# Patient Record
Sex: FEMALE | HISPANIC OR LATINO | Employment: FULL TIME | URBAN - METROPOLITAN AREA
[De-identification: names, ages, dates, MRNs, and addresses within clinical notes are randomized per-mention and may not be internally consistent; named-entity substitution may affect disease eponyms.]

---
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
## 2022-01-14 ENCOUNTER — DOCUMENTATION ONLY (OUTPATIENT)
Dept: PSYCHOLOGY | Facility: CLINIC | Age: 22
End: 2022-01-14
Payer: COMMERCIAL

## 2022-01-14 NOTE — PROGRESS NOTES
Discharge Summary  Multiple Sessions    Client Name: Therese Salinas MRN#: [id number] YOB: 2000      Intake / Discharge Date: intake: 1/28/2021  Seen 5 further sessions through 5/18/2021  Discharge 1/14/2022      DSM5 Diagnoses: (Sustained by DSM5 Criteria Listed Above)  Diagnoses: 296.32 (F33.1) Major Depressive Disorder, Recurrent Episode, Moderate _ and With mixed features  300.01 (F41.0) Panic Disorder  300.02 (F41.1) Generalized Anxiety Disorder   Eating Disorder in remission  Psychosocial & Contextual Factors: COVID 19 restrictions, isolation, struggles with school focus and completion, history of eating disorder, grandfather on ventilator in [place], mother in [place] taking care of father, anxiety about college search  WHODAS 2.0 (12 item) Score: too young for this scale          Presenting Concern:  Anxiety, depression, irritability, reactivity      Reason for Discharge:  Client did not return      Disposition at Time of Last Encounter:   Comments:   Anxious about school work not completed and college hunt     Risk Management:   Client suicidal ideation, self injurious behavior  A safety and risk management plan has been developed including: Patient consented to co-developed safety plan on 1/28/2021.  Safety and risk management plan was reviewed.   Patient agreed to use safety plan should any safety concerns arise.  A copy was made available to the patient.      Referred To:  PCP for medication needs        RAOUL Ncikerson LMFT   1/14/2022

## 2022-06-14 PROBLEM — M79.644 PAIN OF RIGHT THUMB: Status: RESOLVED | Noted: 2021-07-23 | Resolved: 2022-06-14

## 2022-06-14 PROBLEM — T14.8XXA CRUSH INJURY: Status: RESOLVED | Noted: 2021-07-23 | Resolved: 2022-06-14

## 2022-08-25 ENCOUNTER — OFFICE VISIT (OUTPATIENT)
Dept: FAMILY MEDICINE | Facility: CLINIC | Age: 22
End: 2022-08-25
Payer: COMMERCIAL

## 2022-08-25 VITALS
HEIGHT: 66 IN | SYSTOLIC BLOOD PRESSURE: 110 MMHG | TEMPERATURE: 98.6 F | HEART RATE: 96 BPM | BODY MASS INDEX: 32.47 KG/M2 | WEIGHT: 202 LBS | DIASTOLIC BLOOD PRESSURE: 60 MMHG | OXYGEN SATURATION: 98 %

## 2022-08-25 DIAGNOSIS — F41.1 GENERALIZED ANXIETY DISORDER: ICD-10-CM

## 2022-08-25 DIAGNOSIS — F41.0 PANIC ATTACK: ICD-10-CM

## 2022-08-25 DIAGNOSIS — R41.840 ATTENTION DEFICIT: ICD-10-CM

## 2022-08-25 DIAGNOSIS — Z00.00 ROUTINE GENERAL MEDICAL EXAMINATION AT A HEALTH CARE FACILITY: Primary | ICD-10-CM

## 2022-08-25 DIAGNOSIS — Z11.3 SCREENING FOR STDS (SEXUALLY TRANSMITTED DISEASES): ICD-10-CM

## 2022-08-25 DIAGNOSIS — Z91.09 ENVIRONMENTAL ALLERGIES: ICD-10-CM

## 2022-08-25 PROCEDURE — 87591 N.GONORRHOEAE DNA AMP PROB: CPT | Performed by: PHYSICIAN ASSISTANT

## 2022-08-25 PROCEDURE — 99214 OFFICE O/P EST MOD 30 MIN: CPT | Mod: 25 | Performed by: PHYSICIAN ASSISTANT

## 2022-08-25 PROCEDURE — 99395 PREV VISIT EST AGE 18-39: CPT | Performed by: PHYSICIAN ASSISTANT

## 2022-08-25 PROCEDURE — 87491 CHLMYD TRACH DNA AMP PROBE: CPT | Performed by: PHYSICIAN ASSISTANT

## 2022-08-25 RX ORDER — SERTRALINE HYDROCHLORIDE 100 MG/1
100 TABLET, FILM COATED ORAL DAILY
Qty: 90 TABLET | Refills: 1 | Status: SHIPPED | OUTPATIENT
Start: 2022-08-25 | End: 2023-09-06

## 2022-08-25 RX ORDER — CETIRIZINE HYDROCHLORIDE 10 MG/1
10 TABLET ORAL DAILY
Qty: 90 TABLET | Refills: 3 | Status: SHIPPED | OUTPATIENT
Start: 2022-08-25 | End: 2023-09-06

## 2022-08-25 ASSESSMENT — ENCOUNTER SYMPTOMS
COUGH: 0
CHILLS: 0
JOINT SWELLING: 0
SORE THROAT: 0
PARESTHESIAS: 0
EYE PAIN: 0
WEAKNESS: 0
ABDOMINAL PAIN: 0
ARTHRALGIAS: 0
HEADACHES: 0
SHORTNESS OF BREATH: 0
FREQUENCY: 0
PALPITATIONS: 0
HEARTBURN: 0
NERVOUS/ANXIOUS: 0
DIARRHEA: 0
CONSTIPATION: 0
HEMATOCHEZIA: 0
MYALGIAS: 0
DIZZINESS: 0
NAUSEA: 0
DYSURIA: 0
HEMATURIA: 0
FEVER: 0

## 2022-08-25 ASSESSMENT — PATIENT HEALTH QUESTIONNAIRE - PHQ9
SUM OF ALL RESPONSES TO PHQ QUESTIONS 1-9: 21
5. POOR APPETITE OR OVEREATING: MORE THAN HALF THE DAYS
SUM OF ALL RESPONSES TO PHQ QUESTIONS 1-9: 21
10. IF YOU CHECKED OFF ANY PROBLEMS, HOW DIFFICULT HAVE THESE PROBLEMS MADE IT FOR YOU TO DO YOUR WORK, TAKE CARE OF THINGS AT HOME, OR GET ALONG WITH OTHER PEOPLE: EXTREMELY DIFFICULT

## 2022-08-25 ASSESSMENT — ANXIETY QUESTIONNAIRES
7. FEELING AFRAID AS IF SOMETHING AWFUL MIGHT HAPPEN: NEARLY EVERY DAY
1. FEELING NERVOUS, ANXIOUS, OR ON EDGE: MORE THAN HALF THE DAYS
6. BECOMING EASILY ANNOYED OR IRRITABLE: MORE THAN HALF THE DAYS
2. NOT BEING ABLE TO STOP OR CONTROL WORRYING: MORE THAN HALF THE DAYS
GAD7 TOTAL SCORE: 14
5. BEING SO RESTLESS THAT IT IS HARD TO SIT STILL: SEVERAL DAYS
IF YOU CHECKED OFF ANY PROBLEMS ON THIS QUESTIONNAIRE, HOW DIFFICULT HAVE THESE PROBLEMS MADE IT FOR YOU TO DO YOUR WORK, TAKE CARE OF THINGS AT HOME, OR GET ALONG WITH OTHER PEOPLE: VERY DIFFICULT
3. WORRYING TOO MUCH ABOUT DIFFERENT THINGS: MORE THAN HALF THE DAYS
GAD7 TOTAL SCORE: 14

## 2022-08-25 ASSESSMENT — ASTHMA QUESTIONNAIRES
QUESTION_4 LAST FOUR WEEKS HOW OFTEN HAVE YOU USED YOUR RESCUE INHALER OR NEBULIZER MEDICATION (SUCH AS ALBUTEROL): NOT AT ALL
QUESTION_5 LAST FOUR WEEKS HOW WOULD YOU RATE YOUR ASTHMA CONTROL: COMPLETELY CONTROLLED
QUESTION_1 LAST FOUR WEEKS HOW MUCH OF THE TIME DID YOUR ASTHMA KEEP YOU FROM GETTING AS MUCH DONE AT WORK, SCHOOL OR AT HOME: NONE OF THE TIME
QUESTION_2 LAST FOUR WEEKS HOW OFTEN HAVE YOU HAD SHORTNESS OF BREATH: NOT AT ALL
QUESTION_3 LAST FOUR WEEKS HOW OFTEN DID YOUR ASTHMA SYMPTOMS (WHEEZING, COUGHING, SHORTNESS OF BREATH, CHEST TIGHTNESS OR PAIN) WAKE YOU UP AT NIGHT OR EARLIER THAN USUAL IN THE MORNING: NOT AT ALL
ACT_TOTALSCORE: 25
ACT_TOTALSCORE: 25

## 2022-08-25 NOTE — PROGRESS NOTES
SUBJECTIVE:   CC: Therese Salinas is an 21 year old woman who presents for preventive health visit.       Patient has been advised of split billing requirements and indicates understanding: Yes  Healthy Habits:     Getting at least 3 servings of Calcium per day:  NO    Bi-annual eye exam:  NO    Dental care twice a year:  Yes    Sleep apnea or symptoms of sleep apnea:  None    Diet:  Vegetarian/vegan    Frequency of exercise:  None    Taking medications regularly:  Yes    PHQ-2 Total Score: 6    Additional concerns today:  Yes      Going to [employer].   Had some issues with room mate. Wasn't able to complete spring semester. Going back in the fall - new living situation. Back in the dorms.     Concern about ADHD. Has some issues with attention, can't focus for longer periods of time.     Depression and Anxiety Follow-Up    How are you doing with your depression since your last visit? Worsened     How are you doing with your anxiety since your last visit?  No change    Are you having other symptoms that might be associated with depression or anxiety? No    Have you had a significant life event? Grief or Loss     Do you have any concerns with your use of alcohol or other drugs? No    Social History     Tobacco Use     Smoking status: Never Smoker     Smokeless tobacco: Never Used   Substance Use Topics     Alcohol use: Never     Drug use: Never     PHQ 8/23/2021 8/23/2021 8/25/2022   PHQ-9 Total Score 14 14 21   Q9: Thoughts of better off dead/self-harm past 2 weeks Not at all Not at all Several days   F/U: Thoughts of suicide or self-harm - - Yes   F/U: Self harm-plan - - No   F/U: Self-harm action - - No   F/U: Safety concerns - - No   PHQ-A Total Score - - -   PHQ-A Mood affect on daily activities - - -   PHQ-A Suicide Ideation past 2 weeks - - -     MONIQUE-7 SCORE 5/18/2021 8/23/2021 8/25/2022   Total Score 14 (moderate anxiety) - -   Total Score 14 21 14     Last PHQ-9 8/25/2022   1.  Little interest or pleasure  in doing things 3   2.  Feeling down, depressed, or hopeless 3   3.  Trouble falling or staying asleep, or sleeping too much 3   4.  Feeling tired or having little energy 3   5.  Poor appetite or overeating 3   6.  Feeling bad about yourself 2   7.  Trouble concentrating 3   8.  Moving slowly or restless 0   Q9: Thoughts of better off dead/self-harm past 2 weeks 1   PHQ-9 Total Score 21   Difficulty at work, home, or with people -   In the past two weeks have you had thoughts of suicide or self harm? Yes   Do you have concerns about your personal safety or the safety of others? No   In the past 2 weeks have you thought about a plan or had intention to harm yourself? No   In the past 2 weeks have you acted on these thoughts in any way? No     MONIQUE-7  8/25/2022   1. Feeling nervous, anxious, or on edge 2   2. Not being able to stop or control worrying 2   3. Worrying too much about different things 2   4. Trouble relaxing 2   5. Being so restless that it is hard to sit still 1   6. Becoming easily annoyed or irritable 2   7. Feeling afraid, as if something awful might happen 3   MONIQUE-7 Total Score 14   If you checked any problems, how difficult have they made it for you to do your work, take care of things at home, or get along with other people? Very difficult             Follow Up Actions Taken  Mental Health Referral placed     Discussed the following ways the patient can remain in a safe environment:  be around others  Suicide Assessment Five-step Evaluation and Treatment (SAFE-T)      Today's PHQ-2 Score:   PHQ-2 ( 1999 Pfizer) 8/25/2022   Q1: Little interest or pleasure in doing things 3   Q2: Feeling down, depressed or hopeless 3   PHQ-2 Score 6   PHQ-2 Total Score (12-17 Years)- Positive if 3 or more points; Administer PHQ-A if positive -   Q1: Little interest or pleasure in doing things Nearly every day   Q2: Feeling down, depressed or hopeless Nearly every day   PHQ-2 Score 6       Abuse: Current or Past  (Physical, Sexual or Emotional) - No  Do you feel safe in your environment? Yes        Social History     Tobacco Use     Smoking status: Never Smoker     Smokeless tobacco: Never Used   Substance Use Topics     Alcohol use: Never     If you drink alcohol do you typically have >3 drinks per day or >7 drinks per week? No    Alcohol Use 8/25/2022   Prescreen: >3 drinks/day or >7 drinks/week? Not Applicable   Prescreen: >3 drinks/day or >7 drinks/week? -   No flowsheet data found.    Reviewed orders with patient.  Reviewed health maintenance and updated orders accordingly - Yes  BP Readings from Last 3 Encounters:   08/25/22 110/60   08/23/21 115/73   07/06/21 110/71    Wt Readings from Last 3 Encounters:   08/25/22 91.6 kg (202 lb) (98 %, Z= 1.97)*   08/23/21 91.6 kg (202 lb 0.2 oz) (98 %, Z= 1.99)*   07/06/21 89.8 kg (198 lb) (97 %, Z= 1.94)*     * Growth percentiles are based on CDC (Girls, 2-20 Years) data.                    Breast Cancer Screening:        History of abnormal Pap smear: NO - under age 21, PAP not appropriate for age     Reviewed and updated as needed this visit by clinical staff   Tobacco  Allergies  Meds  Problems  Med Hx  Surg Hx  Fam Hx  Soc   Hx          Reviewed and updated as needed this visit by Provider   Tobacco  Allergies  Meds  Problems  Med Hx  Surg Hx  Fam Hx               Review of Systems   Constitutional: Negative for chills and fever.   HENT: Negative for congestion, ear pain, hearing loss and sore throat.    Eyes: Negative for pain and visual disturbance.   Respiratory: Negative for cough and shortness of breath.    Cardiovascular: Negative for chest pain, palpitations and peripheral edema.   Gastrointestinal: Negative for abdominal pain, constipation, diarrhea, heartburn, hematochezia and nausea.   Genitourinary: Negative for dysuria, frequency, genital sores, hematuria and urgency.   Musculoskeletal: Negative for arthralgias, joint swelling and myalgias.   Skin:  "Negative for rash.   Neurological: Negative for dizziness, weakness, headaches and paresthesias.   Psychiatric/Behavioral: Negative for mood changes. The patient is not nervous/anxious.           OBJECTIVE:   /60   Pulse 96   Temp 98.6  F (37  C) (Oral)   Ht 1.683 m (5' 6.25\")   Wt 91.6 kg (202 lb)   SpO2 98%   BMI 32.36 kg/m    Physical Exam  GENERAL: healthy, alert and no distress  EYES: Eyes grossly normal to inspection, PERRL and conjunctivae and sclerae normal  HENT: ear canals and TM's normal, nose and mouth without ulcers or lesions  NECK: no adenopathy, no asymmetry, masses, or scars and thyroid normal to palpation  RESP: lungs clear to auscultation - no rales, rhonchi or wheezes  BREAST: normal without masses, tenderness or nipple discharge and no palpable axillary masses or adenopathy  CV: regular rate and rhythm, normal S1 S2, no S3 or S4, no murmur, click or rub, no peripheral edema and peripheral pulses strong  ABDOMEN: soft, nontender, no hepatosplenomegaly, no masses and bowel sounds normal  MS: no gross musculoskeletal defects noted, no edema  SKIN: no suspicious lesions or rashes  NEURO: Normal strength and tone, mentation intact and speech normal  PSYCH: mentation appears normal, affect normal/bright    Diagnostic Test Results:  Labs reviewed in Epic    ASSESSMENT/PLAN:   1. Routine general medical examination at a health care facility  Well adult.     2. Generalized anxiety disorder  3. Panic attack  Refilled medications.   - Adult Mental Health  Referral; Future  - sertraline (ZOLOFT) 100 MG tablet; Take 1 tablet (100 mg) by mouth daily  Dispense: 90 tablet; Refill: 1    4. Attention deficit  Patient interested in ADHD testing.  - [facility]; Future    5. Environmental allergies  Suggest switching to zyrtec. Has dog allergy - new dog at home, but moving back to college soon.  - cetirizine (ZYRTEC) 10 MG tablet; Take 1 tablet (10 mg) by mouth daily  " "Dispense: 90 tablet; Refill: 3    6. Screening for STDs (sexually transmitted diseases)  Screen.  - NEISSERIA GONORRHOEA PCR  - CHLAMYDIA TRACHOMATIS PCR      Patient has been advised of split billing requirements and indicates understanding: Yes    COUNSELING:  Special attention given to:        Regular exercise       Healthy diet/nutrition       Contraception    Estimated body mass index is 32.36 kg/m  as calculated from the following:    Height as of this encounter: 1.683 m (5' 6.25\").    Weight as of this encounter: 91.6 kg (202 lb).    Weight management plan: Discussed healthy diet and exercise guidelines    She reports that she has never smoked. She has never used smokeless tobacco.      Counseling Resources:  ATP IV Guidelines  Pooled Cohorts Equation Calculator  Breast Cancer Risk Calculator  BRCA-Related Cancer Risk Assessment: FHS-7 Tool  FRAX Risk Assessment  ICSI Preventive Guidelines  Dietary Guidelines for Americans, 2010  USDA's MyPlate  ASA Prophylaxis  Lung CA Screening    Gauri Dickens PA-C  [facility] [place]  Answers for HPI/ROS submitted by the patient on 8/25/2022  If you checked off any problems, how difficult have these problems made it for you to do your work, take care of things at home, or get along with other people?: Extremely difficult  PHQ9 TOTAL SCORE: 21      "

## 2022-08-26 LAB
C TRACH DNA SPEC QL NAA+PROBE: NEGATIVE
N GONORRHOEA DNA SPEC QL NAA+PROBE: NEGATIVE

## 2022-09-07 ENCOUNTER — TELEPHONE (OUTPATIENT)
Dept: FAMILY MEDICINE | Facility: CLINIC | Age: 22
End: 2022-09-07

## 2022-09-07 NOTE — TELEPHONE ENCOUNTER
Reason for Call:  Form, our goal is to have forms completed with 72 hours, however, some forms may require a visit or additional information.    Type of letter, form or note:  [employer] REWARD FORM    Who is the form from?: Patient    Where did the form come from: Patient or family brought in       What clinic location was the form placed at?: [facility]    Where the form was placed: Provider's Box Box/Folder    What number is listed as a contact on the form?: [phone]       Additional comments: Please mail completed form to patient's home at [address] [place] [phone]    Call taken on 9/7/2022 at 12:44 PM by Amira Cintron

## 2022-09-17 ENCOUNTER — HEALTH MAINTENANCE LETTER (OUTPATIENT)
Age: 22
End: 2022-09-17

## 2022-09-23 DIAGNOSIS — J30.2 SEASONAL ALLERGIC RHINITIS, UNSPECIFIED TRIGGER: ICD-10-CM

## 2022-09-23 DIAGNOSIS — J45.20 MILD INTERMITTENT ASTHMA WITHOUT COMPLICATION: ICD-10-CM

## 2022-09-23 NOTE — TELEPHONE ENCOUNTER
albuterol (PROAIR HFA/PROVENTIL HFA/VENTOLIN HFA) 108 (90 Base) MCG/ACT inhaler 8.5 g 3 8/23/2021

## 2022-09-24 RX ORDER — ALBUTEROL SULFATE 90 UG/1
2 AEROSOL, METERED RESPIRATORY (INHALATION) EVERY 6 HOURS
Qty: 8.5 G | Refills: 3 | Status: SHIPPED | OUTPATIENT
Start: 2022-09-24 | End: 2022-10-28

## 2022-09-24 NOTE — TELEPHONE ENCOUNTER
ACT Total Scores 6/1/2021 8/23/2021 8/25/2022   ACT TOTAL SCORE (Goal Greater than or Equal to 20) 23 25 25   In the past 12 months, how many times did you visit the emergency room for your asthma without being admitted to the hospital? 0 0 0   In the past 12 months, how many times were you hospitalized overnight because of your asthma? 0 0 0

## 2022-11-09 ENCOUNTER — MYC REFILL (OUTPATIENT)
Dept: FAMILY MEDICINE | Facility: CLINIC | Age: 22
End: 2022-11-09

## 2022-11-09 DIAGNOSIS — F41.0 PANIC ATTACK: ICD-10-CM

## 2022-11-09 DIAGNOSIS — F41.1 GENERALIZED ANXIETY DISORDER: ICD-10-CM

## 2022-11-10 RX ORDER — LORAZEPAM 1 MG/1
0.5 TABLET ORAL EVERY 8 HOURS PRN
Qty: 10 TABLET | Refills: 0 | Status: SHIPPED | OUTPATIENT
Start: 2022-11-10 | End: 2023-09-09

## 2023-03-31 ENCOUNTER — MYC MEDICAL ADVICE (OUTPATIENT)
Dept: FAMILY MEDICINE | Facility: CLINIC | Age: 23
End: 2023-03-31

## 2023-03-31 ENCOUNTER — VIRTUAL VISIT (OUTPATIENT)
Dept: FAMILY MEDICINE | Facility: CLINIC | Age: 23
End: 2023-03-31
Payer: COMMERCIAL

## 2023-03-31 DIAGNOSIS — F41.1 GENERALIZED ANXIETY DISORDER: ICD-10-CM

## 2023-03-31 DIAGNOSIS — R06.83 SNORING: Primary | ICD-10-CM

## 2023-03-31 PROCEDURE — 96127 BRIEF EMOTIONAL/BEHAV ASSMT: CPT | Performed by: PHYSICIAN ASSISTANT

## 2023-03-31 PROCEDURE — 99214 OFFICE O/P EST MOD 30 MIN: CPT | Mod: VID | Performed by: PHYSICIAN ASSISTANT

## 2023-03-31 ASSESSMENT — PATIENT HEALTH QUESTIONNAIRE - PHQ9
SUM OF ALL RESPONSES TO PHQ QUESTIONS 1-9: 16
10. IF YOU CHECKED OFF ANY PROBLEMS, HOW DIFFICULT HAVE THESE PROBLEMS MADE IT FOR YOU TO DO YOUR WORK, TAKE CARE OF THINGS AT HOME, OR GET ALONG WITH OTHER PEOPLE: VERY DIFFICULT
SUM OF ALL RESPONSES TO PHQ QUESTIONS 1-9: 16

## 2023-03-31 ASSESSMENT — ASTHMA QUESTIONNAIRES
QUESTION_4 LAST FOUR WEEKS HOW OFTEN HAVE YOU USED YOUR RESCUE INHALER OR NEBULIZER MEDICATION (SUCH AS ALBUTEROL): NOT AT ALL
ACT_TOTALSCORE: 25
QUESTION_2 LAST FOUR WEEKS HOW OFTEN HAVE YOU HAD SHORTNESS OF BREATH: NOT AT ALL
ACT_TOTALSCORE: 25
QUESTION_5 LAST FOUR WEEKS HOW WOULD YOU RATE YOUR ASTHMA CONTROL: COMPLETELY CONTROLLED
QUESTION_1 LAST FOUR WEEKS HOW MUCH OF THE TIME DID YOUR ASTHMA KEEP YOU FROM GETTING AS MUCH DONE AT WORK, SCHOOL OR AT HOME: NONE OF THE TIME
QUESTION_3 LAST FOUR WEEKS HOW OFTEN DID YOUR ASTHMA SYMPTOMS (WHEEZING, COUGHING, SHORTNESS OF BREATH, CHEST TIGHTNESS OR PAIN) WAKE YOU UP AT NIGHT OR EARLIER THAN USUAL IN THE MORNING: NOT AT ALL

## 2023-03-31 NOTE — PROGRESS NOTES
"Therese is a 22 year old who is being evaluated via a billable video visit.      How would you like to obtain your AVS? MyChart  If the video visit is dropped, the invitation should be resent by: Text to cell phone: [phone]  Will anyone else be joining your video visit? No          Assessment & Plan     Snoring  Issues with snoring, mouth breathing. Because of possible apneic episode - will start with sleep medicine. May need ENT eval if having obstructive symptoms as well.   - Adult Sleep Eval & Management Referral; Future    Generalized anxiety disorder  Ongoing anxiety. Will restart sertraline - take it more regularly. Then follow up in 6 weeks or so. She has forms for school for single rooms she'd like me to fill out. She'll send it to me on [employer]. Room mate situation increases anxiety.                BMI:   Estimated body mass index is 32.36 kg/m  as calculated from the following:    Height as of 8/25/22: 1.683 m (5' 6.25\").    Weight as of 8/25/22: 91.6 kg (202 lb).   Weight management plan: Discussed healthy diet and exercise guidelines    Depression Screening Follow Up    PHQ 3/31/2023   PHQ-9 Total Score 16   Q9: Thoughts of better off dead/self-harm past 2 weeks Several days   F/U: Thoughts of suicide or self-harm No   F/U: Self harm-plan -   F/U: Self-harm action -   F/U: Safety concerns No   PHQ-A Total Score -   PHQ-A Mood affect on daily activities -   PHQ-A Suicide Ideation past 2 weeks -                 Follow Up    Follow Up Actions Taken  Follow up in 6 weeks.     Discussed the following ways the patient can remain in a safe environment:  be around others      Gauri Dickens PA-C  [facility]    Mega Saleh is a 22 year old, presenting for the following health issues:  No chief complaint on file.    Additional Questions 3/31/2023   Roomed by spenser     History of Present Illness       Reason for visit:  Sleep apnea  Symptom onset:  More than a month  Symptoms " include:  Snoring, feeling fatigued after a good nights rest, dry throat  Symptom intensity:  Moderate  Symptom progression:  Worsening  Had these symptoms before:  No    She eats 0-1 servings of fruits and vegetables daily.She consumes 1 sweetened beverage(s) daily.She exercises with enough effort to increase her heart rate 9 or less minutes per day.  She exercises with enough effort to increase her heart rate 3 or less days per week. She is missing 4 dose(s) of medications per week.  She is not taking prescribed medications regularly due to side effects and other.    Today's PHQ-9         PHQ-9 Total Score: 16    PHQ-9 Q9 Thoughts of better off dead/self-harm past 2 weeks :   Several days  Thoughts of suicide or self harm: (P) No  Self-harm Plan:     Self-harm Action:       Safety concerns for self or others: (P) No    How difficult have these problems made it for you to do your work, take care of things at home, or get along with other people: Very difficult       Concerns about stopping breathing in the middle of the night - per room mate. Mouth is always dry - breathes through her nose.     Mood - has been up and down. Hasn't been great about taking zoloft.       PHQ 8/23/2021 8/25/2022 3/31/2023   PHQ-9 Total Score 14 21 16   Q9: Thoughts of better off dead/self-harm past 2 weeks Not at all Several days Several days   F/U: Thoughts of suicide or self-harm - Yes No   F/U: Self harm-plan - No -   F/U: Self-harm action - No -   F/U: Safety concerns - No No   PHQ-A Total Score - - -   PHQ-A Mood affect on daily activities - - -   PHQ-A Suicide Ideation past 2 weeks - - -     MONIQUE-7 SCORE 5/18/2021 8/23/2021 8/25/2022   Total Score 14 (moderate anxiety) - -   Total Score 14 21 14         Review of Systems   Constitutional, HEENT, cardiovascular, pulmonary, gi and gu systems are negative, except as otherwise noted.      Objective           Vitals:  No vitals were obtained today due to virtual visit.    Physical Exam    GENERAL: Healthy, alert and no distress  EYES: Eyes grossly normal to inspection.  No discharge or erythema, or obvious scleral/conjunctival abnormalities.  RESP: No audible wheeze, cough, or visible cyanosis.  No visible retractions or increased work of breathing.    SKIN: Visible skin clear. No significant rash, abnormal pigmentation or lesions.  NEURO: Cranial nerves grossly intact.  Mentation and speech appropriate for age.  PSYCH: Mentation appears normal, affect normal/bright, judgement and insight intact, normal speech and appearance well-groomed.                Video-Visit Details    Type of service:  Video Visit   Video Start Time: 3:01 PM  Video End Time:3:11 PM    Originating Location (pt. Location): Home    Distant Location (provider location):  On-site  Platform used for Video Visit: Sp

## 2023-04-10 NOTE — TELEPHONE ENCOUNTER
School is stating they have not received the response to the form completed via link.  Patient is requesting a copy of e-mail confirmation. School informed her that a PDF copy of answers should have been sent via e-mail.     Patient would like this sent to email - [email or web address]    Nicole Varner -    [facility]

## 2023-04-11 NOTE — TELEPHONE ENCOUNTER
I didn't put in my email for a copy - so redid it. Will email to Nicole Varner if I receive it.    Gauri Dickens PA-C

## 2023-07-26 ENCOUNTER — PATIENT OUTREACH (OUTPATIENT)
Dept: CARE COORDINATION | Facility: CLINIC | Age: 23
End: 2023-07-26
Payer: COMMERCIAL

## 2023-09-05 DIAGNOSIS — F41.0 PANIC ATTACK: ICD-10-CM

## 2023-09-05 DIAGNOSIS — Z91.09 ENVIRONMENTAL ALLERGIES: ICD-10-CM

## 2023-09-05 DIAGNOSIS — F41.1 GENERALIZED ANXIETY DISORDER: ICD-10-CM

## 2023-09-06 RX ORDER — CETIRIZINE HYDROCHLORIDE 10 MG/1
10 TABLET ORAL DAILY
Qty: 30 TABLET | Refills: 11 | Status: SHIPPED | OUTPATIENT
Start: 2023-09-06 | End: 2023-12-28

## 2023-09-06 RX ORDER — SERTRALINE HYDROCHLORIDE 100 MG/1
100 TABLET, FILM COATED ORAL DAILY
Qty: 90 TABLET | Refills: 0 | Status: SHIPPED | OUTPATIENT
Start: 2023-09-06 | End: 2023-09-09

## 2023-09-09 ENCOUNTER — MYC REFILL (OUTPATIENT)
Dept: FAMILY MEDICINE | Facility: CLINIC | Age: 23
End: 2023-09-09
Payer: COMMERCIAL

## 2023-09-09 DIAGNOSIS — F41.0 PANIC ATTACK: ICD-10-CM

## 2023-09-09 DIAGNOSIS — F41.1 GENERALIZED ANXIETY DISORDER: ICD-10-CM

## 2023-09-11 RX ORDER — LORAZEPAM 1 MG/1
0.5 TABLET ORAL EVERY 8 HOURS PRN
Qty: 10 TABLET | Refills: 0 | Status: SHIPPED | OUTPATIENT
Start: 2023-09-11 | End: 2023-12-28

## 2023-09-11 RX ORDER — SERTRALINE HYDROCHLORIDE 100 MG/1
100 TABLET, FILM COATED ORAL DAILY
Qty: 60 TABLET | Refills: 0 | Status: SHIPPED | OUTPATIENT
Start: 2023-09-11 | End: 2023-10-16

## 2023-09-28 DIAGNOSIS — F41.1 GENERALIZED ANXIETY DISORDER: ICD-10-CM

## 2023-09-28 DIAGNOSIS — F41.0 PANIC ATTACK: ICD-10-CM

## 2023-09-28 RX ORDER — SERTRALINE HYDROCHLORIDE 100 MG/1
100 TABLET, FILM COATED ORAL DAILY
Qty: 60 TABLET | Refills: 0 | OUTPATIENT
Start: 2023-09-28

## 2023-10-08 ENCOUNTER — HEALTH MAINTENANCE LETTER (OUTPATIENT)
Age: 23
End: 2023-10-08

## 2023-10-16 ENCOUNTER — OFFICE VISIT (OUTPATIENT)
Dept: INTERNAL MEDICINE | Facility: CLINIC | Age: 23
End: 2023-10-16
Payer: COMMERCIAL

## 2023-10-16 VITALS
TEMPERATURE: 98.7 F | HEART RATE: 89 BPM | DIASTOLIC BLOOD PRESSURE: 76 MMHG | SYSTOLIC BLOOD PRESSURE: 116 MMHG | WEIGHT: 204 LBS | BODY MASS INDEX: 32.78 KG/M2 | HEIGHT: 66 IN | OXYGEN SATURATION: 96 %

## 2023-10-16 DIAGNOSIS — Z83.3 FAMILY HISTORY OF DIABETES MELLITUS: ICD-10-CM

## 2023-10-16 DIAGNOSIS — F41.1 GENERALIZED ANXIETY DISORDER: ICD-10-CM

## 2023-10-16 DIAGNOSIS — Z11.59 NEED FOR HEPATITIS C SCREENING TEST: ICD-10-CM

## 2023-10-16 DIAGNOSIS — Z11.3 SCREENING FOR STDS (SEXUALLY TRANSMITTED DISEASES): ICD-10-CM

## 2023-10-16 DIAGNOSIS — K59.00 CONSTIPATION, UNSPECIFIED CONSTIPATION TYPE: ICD-10-CM

## 2023-10-16 DIAGNOSIS — Z11.4 SCREENING FOR HIV (HUMAN IMMUNODEFICIENCY VIRUS): Primary | ICD-10-CM

## 2023-10-16 DIAGNOSIS — F41.0 PANIC ATTACK: ICD-10-CM

## 2023-10-16 DIAGNOSIS — Z00.00 ROUTINE GENERAL MEDICAL EXAMINATION AT A HEALTH CARE FACILITY: ICD-10-CM

## 2023-10-16 DIAGNOSIS — J45.20 MILD INTERMITTENT ASTHMA WITHOUT COMPLICATION: ICD-10-CM

## 2023-10-16 DIAGNOSIS — J30.2 SEASONAL ALLERGIC RHINITIS, UNSPECIFIED TRIGGER: ICD-10-CM

## 2023-10-16 DIAGNOSIS — Z83.42 FAMILY HISTORY OF HIGH CHOLESTEROL: ICD-10-CM

## 2023-10-16 LAB
CHOLEST SERPL-MCNC: 162 MG/DL
HBA1C MFR BLD: 5.4 % (ref 0–5.6)
HDLC SERPL-MCNC: 28 MG/DL
LDLC SERPL CALC-MCNC: 99 MG/DL
NONHDLC SERPL-MCNC: 134 MG/DL
TRIGL SERPL-MCNC: 176 MG/DL

## 2023-10-16 PROCEDURE — 99395 PREV VISIT EST AGE 18-39: CPT

## 2023-10-16 PROCEDURE — 36415 COLL VENOUS BLD VENIPUNCTURE: CPT

## 2023-10-16 PROCEDURE — 83036 HEMOGLOBIN GLYCOSYLATED A1C: CPT

## 2023-10-16 PROCEDURE — 99214 OFFICE O/P EST MOD 30 MIN: CPT | Mod: 25

## 2023-10-16 PROCEDURE — 80061 LIPID PANEL: CPT

## 2023-10-16 RX ORDER — SENNA AND DOCUSATE SODIUM 50; 8.6 MG/1; MG/1
1 TABLET, FILM COATED ORAL AT BEDTIME
Qty: 30 TABLET | Refills: 3 | Status: SHIPPED | OUTPATIENT
Start: 2023-10-16

## 2023-10-16 RX ORDER — SERTRALINE HYDROCHLORIDE 100 MG/1
100 TABLET, FILM COATED ORAL DAILY
Qty: 60 TABLET | Refills: 3 | Status: SHIPPED | OUTPATIENT
Start: 2023-10-16

## 2023-10-16 RX ORDER — ALBUTEROL SULFATE 90 UG/1
2 AEROSOL, METERED RESPIRATORY (INHALATION) EVERY 6 HOURS
Qty: 8.5 G | Refills: 2 | Status: SHIPPED | OUTPATIENT
Start: 2023-10-16 | End: 2023-12-22

## 2023-10-16 ASSESSMENT — ENCOUNTER SYMPTOMS
DIZZINESS: 0
SHORTNESS OF BREATH: 0
PALPITATIONS: 0
CHILLS: 0
NERVOUS/ANXIOUS: 1
HEMATURIA: 0
SORE THROAT: 0
WEAKNESS: 0
ABDOMINAL PAIN: 0
COUGH: 0
DIARRHEA: 0
MYALGIAS: 0
ARTHRALGIAS: 0
NAUSEA: 0
CONSTIPATION: 0
FEVER: 0
JOINT SWELLING: 0
BREAST MASS: 0
DYSURIA: 0
HEADACHES: 0
FREQUENCY: 0
EYE PAIN: 0
PARESTHESIAS: 0
HEMATOCHEZIA: 0
HEARTBURN: 0

## 2023-10-16 ASSESSMENT — COLUMBIA-SUICIDE SEVERITY RATING SCALE - C-SSRS
6. HAVE YOU EVER DONE ANYTHING, STARTED TO DO ANYTHING, OR PREPARED TO DO ANYTHING TO END YOUR LIFE?: NO
1. WITHIN THE PAST MONTH, HAVE YOU WISHED YOU WERE DEAD OR WISHED YOU COULD GO TO SLEEP AND NOT WAKE UP?: NO
2. IN THE PAST MONTH, HAVE YOU ACTUALLY HAD ANY THOUGHTS OF KILLING YOURSELF?: NO

## 2023-10-16 ASSESSMENT — PATIENT HEALTH QUESTIONNAIRE - PHQ9
10. IF YOU CHECKED OFF ANY PROBLEMS, HOW DIFFICULT HAVE THESE PROBLEMS MADE IT FOR YOU TO DO YOUR WORK, TAKE CARE OF THINGS AT HOME, OR GET ALONG WITH OTHER PEOPLE: EXTREMELY DIFFICULT
SUM OF ALL RESPONSES TO PHQ QUESTIONS 1-9: 17
SUM OF ALL RESPONSES TO PHQ QUESTIONS 1-9: 17

## 2023-10-16 ASSESSMENT — ASTHMA QUESTIONNAIRES: ACT_TOTALSCORE: 20

## 2023-10-16 NOTE — PROGRESS NOTES
SUBJECTIVE:   CC: Therese is an 22 year old who presents for preventive health visit.       10/16/2023     8:45 AM   Additional Questions   Roomed by Tana MEYER       Healthy Habits:     Getting at least 3 servings of Calcium per day:  Yes    Bi-annual eye exam:  NO    Dental care twice a year:  NO    Sleep apnea or symptoms of sleep apnea:  Daytime drowsiness    Diet:  Vegetarian/vegan    Frequency of exercise:  2-3 days/week    Duration of exercise:  30-45 minutes    Taking medications regularly:  Yes    Medication side effects:  None    Additional concerns today:  Yes      Today's PHQ-9 Score:       10/16/2023     8:43 AM   PHQ-9 SCORE   PHQ-9 Total Score MyChart 17 (Moderately severe depression)   PHQ-9 Total Score 17                 Med refills      Social History     Tobacco Use    Smoking status: Never    Smokeless tobacco: Never   Substance Use Topics    Alcohol use: Never             10/16/2023     8:45 AM   Alcohol Use   Prescreen: >3 drinks/day or >7 drinks/week? No     Reviewed orders with patient.  Reviewed health maintenance and updated orders accordingly - Yes  Lab work is in process    Breast Cancer Screening: Not needed at this time.         History of abnormal Pap smear: NO - under age 21, PAP not appropriate for age     Reviewed and updated as needed this visit by clinical staff   Tobacco  Allergies  Meds              Reviewed and updated as needed this visit by Provider                     Review of Systems   Constitutional:  Negative for chills and fever.   HENT:  Positive for congestion. Negative for ear pain, hearing loss and sore throat.    Eyes:  Negative for pain and visual disturbance.   Respiratory:  Negative for cough and shortness of breath.    Cardiovascular:  Negative for chest pain, palpitations and peripheral edema.   Gastrointestinal:  Negative for abdominal pain, constipation, diarrhea, heartburn, hematochezia and nausea.   Breasts:  Negative for tenderness, breast mass and  "discharge.   Genitourinary:  Negative for dysuria, frequency, genital sores, hematuria, pelvic pain, urgency, vaginal bleeding and vaginal discharge.   Musculoskeletal:  Negative for arthralgias, joint swelling and myalgias.   Skin:  Negative for rash.   Neurological:  Negative for dizziness, weakness, headaches and paresthesias.   Psychiatric/Behavioral:  Negative for mood changes. The patient is nervous/anxious.           OBJECTIVE:   /76 (BP Location: Right arm, Patient Position: Sitting, Cuff Size: Adult Large)   Pulse 89   Temp 98.7  F (37.1  C) (Oral)   Ht 1.676 m (5' 6\")   Wt 92.5 kg (204 lb)   LMP 10/07/2023 (Exact Date)   SpO2 96%   BMI 32.93 kg/m    Physical Exam  Constitutional:       Appearance: Normal appearance.   HENT:      Head: Normocephalic.      Right Ear: Tympanic membrane normal.      Nose: Nose normal.      Mouth/Throat:      Mouth: Mucous membranes are moist.   Eyes:      Pupils: Pupils are equal, round, and reactive to light.   Cardiovascular:      Rate and Rhythm: Normal rate and regular rhythm.      Pulses: Normal pulses.      Heart sounds: Normal heart sounds.   Pulmonary:      Effort: Pulmonary effort is normal. No respiratory distress.      Breath sounds: Normal breath sounds. No wheezing.   Abdominal:      General: Bowel sounds are normal. There is no distension.      Tenderness: There is no abdominal tenderness.   Musculoskeletal:         General: No swelling or deformity. Normal range of motion.   Skin:     General: Skin is warm.      Coloration: Skin is not jaundiced.   Neurological:      General: No focal deficit present.      Mental Status: She is alert and oriented to person, place, and time.   Psychiatric:         Thought Content: Thought content normal.         Judgment: Judgment normal.      Comments: Patient is feeling Depressed and was a bit tearful           Diagnostic Test Results:  Labs reviewed in Epic  Results for orders placed or performed in visit on " 10/16/23   Hemoglobin A1c     Status: Normal   Result Value Ref Range    Hemoglobin A1C 5.4 0.0 - 5.6 %       ASSESSMENT/PLAN:   (Z00.00) Routine general medical examination at a health care facility  Comment: Patient was seen in clinic today for preventive health exam and to discuss feelings of depression and anxiety. She ran out of sertraline 2 months ago and has not been able to get refill. Patient did deny suicidal ideation and denied feeling suicidal in the last 2 months. Patient did state that their depression was interfering with getting school work done and sometimes she finds it hard to get out of bed to attend class. Discussed mental health referral. Discussed resources to help with mental health and the importance of follow up with any increase in depression and anxiety, or thoughts of suicide. Discussed the need for labs this visit.   Plan: REVIEW OF HEALTH MAINTENANCE PROTOCOL ORDERS,         PRIMARY CARE FOLLOW-UP SCHEDULING            (Z11.4) Screening for HIV (human immunodeficiency virus)  (primary encounter diagnosis)  Comment: Discussed patient has no risk factors and declined testing   Plan: Declined. Will reassess in future as warranted.    (Z11.59) Need for hepatitis C screening test  Comment: Discussed patient has no risk factors and declined testing  Plan: Declined. Will reassess in future as warranted.    (Z11.3) Screening for STDs (sexually transmitted diseases)  Comment: Discussed patient has no risk factors and declined testing  Plan:  Declined. Will reassess in future as warranted.    (F41.1) Generalized anxiety disorder  Comment: Chronic, unstable. Patient has not been taking sertraline as she ran out and could not get medication refilled. Patient was seen today and assessed for suicidal ideation patient denied. Patient has been having issues with getting out of bed due to depression and stress. Patient is a college student and has found that dealing with the stress of school and  personal life has been overwhelming. Discussed with patient need for adult mental health referral and restarting sertraline. Discussed need to make follow up appointment via telephone to update on how she is doing in 14 days. Discussed updating with any thoughts of suicide or worsening depression.   Plan: Adult Mental Health  Referral,        Restart  sertraline (ZOLOFT)  50 mg and will increase dose to 100 MG tablet as tolerated          (F41.0) Panic attack  Comment: Chronic, stable. Patient stated that they have not needed any Ativan recently.   Plan: Restart. sertraline (ZOLOFT)  start at 50 mg and increase to 100 MG tablet as tolerated.Patient will update with any increase in symptoms.            (J30.2) Seasonal allergic rhinitis, unspecified trigger  Comment: Discussed over the counter medications that can help decrease allergy symptoms.   Plan: albuterol (PROAIR HFA/PROVENTIL HFA/VENTOLIN         HFA) 108 (90 Base) MCG/ACT inhaler taking as needed.             (J45.20) Mild intermittent asthma without complication  Comment: Chronic, stable. Patient states that asthma is well controlled with just minor increase in albuterol use when allergy symptoms are increased.  Plan: albuterol (PROAIR HFA/PROVENTIL HFA/VENTOLIN         HFA) 108 (90 Base) MCG/ACT inhaler            (K59.00) Constipation, unspecified constipation type  Comment: Chronic, unstable. Patient is making complaints of constipation. Discussed using senna s to help manage symptoms  Plan: Take 1 tablet SENNA-docusate sodium (SENNA S) 8.6-50 MG Tablet at bedtime.         (Z83.3) Family history of diabetes mellitus  Comment: Patient has family history of type 2 diabetes and has concerns regarding her risk. Discussed appropriate labs.   Plan: Hemoglobin A1c        Pending    (Z83.42) Family history of high cholesterol  Comment: Patient has concerns about high cholesterol. Discussion was had with patient about appropriate labs.  Plan: Lipid  panel reflex to direct LDL Non-fasting        Pending      Patient has been advised of split billing requirements and indicates understanding: Yes    Depression Screening Follow Up        10/16/2023     8:43 AM   PHQ   PHQ-9 Total Score 17   Q9: Thoughts of better off dead/self-harm past 2 weeks Several days   F/U: Thoughts of suicide or self-harm Yes   F/U: Self harm-plan No   F/U: Self-harm action No   F/U: Safety concerns No         10/16/2023     8:43 AM   Last PHQ-9   1.  Little interest or pleasure in doing things 3   2.  Feeling down, depressed, or hopeless 2   3.  Trouble falling or staying asleep, or sleeping too much 1   4.  Feeling tired or having little energy 3   5.  Poor appetite or overeating 1   6.  Feeling bad about yourself 3   7.  Trouble concentrating 3   8.  Moving slowly or restless 0   Q9: Thoughts of better off dead/self-harm past 2 weeks 1   PHQ-9 Total Score 17   In the past two weeks have you had thoughts of suicide or self harm? Yes   Do you have concerns about your personal safety or the safety of others? No   In the past 2 weeks have you thought about a plan or had intention to harm yourself? No   In the past 2 weeks have you acted on these thoughts in any way? No             10/16/2023    10:56 AM   C-SSRS (Brief [place])   Within the last month, have you wished you were dead or wished you could go to sleep and not wake up? No   Within the last month, have you had any actual thoughts of killing yourself? No   Within the last month, have you ever done anything, started to do anything, or prepared to do anything to end your life? No         Follow Up        Follow Up Actions Taken  Patient to follow up with PCP.  Clinic staff to schedule appointment if able.  Mental Health Referral placed    Discussed the following ways the patient can remain in a safe environment:  be around others and Discussed with patient resources available on and off campus to help with depression  symptoms.    COUNSELING:  Reviewed preventive health counseling, as reflected in patient instructions        She reports that she has never smoked. She has never used smokeless tobacco.          RON Dumont CNP  [facility] [place]  Answers submitted by the patient for this visit:  Patient Health Questionnaire (Submitted on 10/16/2023)  If you checked off any problems, how difficult have these problems made it for you to do your work, take care of things at home, or get along with other people?: Extremely difficult  PHQ9 TOTAL SCORE: 17  Annual Preventive Visit (Submitted on 10/16/2023)  Chief Complaint: Annual Exam:  Frequency of exercise:: 2-3 days/week  Getting at least 3 servings of Calcium per day:: Yes  Diet:: Vegetarian/vegan  Taking medications regularly:: Yes  Medication side effects:: None  Bi-annual eye exam:: NO  Dental care twice a year:: NO  Sleep apnea or symptoms of sleep apnea:: Daytime drowsiness  abdominal pain: No  Blood in stool: No  Blood in urine: No  chest pain: No  chills: No  congestion: Yes  constipation: No  cough: No  diarrhea: No  dizziness: No  ear pain: No  eye pain: No  nervous/anxious: Yes  fever: No  frequency: No  genital sores: No  headaches: No  hearing loss: No  heartburn: No  arthralgias: No  joint swelling: No  peripheral edema: No  mood changes: No  myalgias: No  nausea: No  dysuria: No  palpitations: No  Skin sensation changes: No  sore throat: No  urgency: No  rash: No  shortness of breath: No  visual disturbance: No  weakness: No  pelvic pain: No  vaginal bleeding: No  vaginal discharge: No  tenderness: No  breast mass: No  breast discharge: No  Additional concerns today:: Yes  Exercise outside of work (Submitted on 10/16/2023)  Chief Complaint: Annual Exam:  Duration of exercise:: 30-45 minutes

## 2023-10-16 NOTE — PATIENT INSTRUCTIONS
Ok With Telephone visit follow up in 14 days      Preventive Health Recommendations  Female Ages 18 to 20     Yearly exam:   See your health care provider every year in order to  Review health changes.   Discuss preventive care.    Review your medicines if your doctor has prescribed any.    You should be tested each year for STDs (sexually transmitted diseases).     After age 20, talk to your provider about how often you should have cholesterol testing.    If you are at risk for diabetes, you should have a diabetes test (fasting glucose).     Shots:   Get a flu shot each year.   Get a tetanus shot every 10 years.   Consider getting the shot (vaccine) that prevents cervical cancer (Gardasil).    Nutrition:   Eat at least 5 servings of fruits and vegetables each day.  Eat whole-grain bread, whole-wheat pasta and brown rice instead of white grains and rice.  Get adequate Calcium and Vitamin D.     Lifestyle  Exercise at least 150 minutes a week each week (30 minutes a day, 5 days a week). This will help you control your weight and prevent disease.  No smoking.   Wear sunscreen to prevent skin cancer.  See your dentist every six months for an exam and cleaning.

## 2023-10-31 ENCOUNTER — MYC MEDICAL ADVICE (OUTPATIENT)
Dept: BEHAVIORAL HEALTH | Facility: CLINIC | Age: 23
End: 2023-10-31
Payer: COMMERCIAL

## 2023-10-31 ENCOUNTER — VIRTUAL VISIT (OUTPATIENT)
Dept: BEHAVIORAL HEALTH | Facility: CLINIC | Age: 23
End: 2023-10-31
Payer: COMMERCIAL

## 2023-10-31 DIAGNOSIS — F41.1 GENERALIZED ANXIETY DISORDER: ICD-10-CM

## 2023-10-31 PROCEDURE — 90834 PSYTX W PT 45 MINUTES: CPT | Mod: 95 | Performed by: SOCIAL WORKER

## 2023-10-31 ASSESSMENT — ANXIETY QUESTIONNAIRES
8. IF YOU CHECKED OFF ANY PROBLEMS, HOW DIFFICULT HAVE THESE MADE IT FOR YOU TO DO YOUR WORK, TAKE CARE OF THINGS AT HOME, OR GET ALONG WITH OTHER PEOPLE?: EXTREMELY DIFFICULT
IF YOU CHECKED OFF ANY PROBLEMS ON THIS QUESTIONNAIRE, HOW DIFFICULT HAVE THESE PROBLEMS MADE IT FOR YOU TO DO YOUR WORK, TAKE CARE OF THINGS AT HOME, OR GET ALONG WITH OTHER PEOPLE: EXTREMELY DIFFICULT
GAD7 TOTAL SCORE: 18
2. NOT BEING ABLE TO STOP OR CONTROL WORRYING: NEARLY EVERY DAY
3. WORRYING TOO MUCH ABOUT DIFFERENT THINGS: NEARLY EVERY DAY
GAD7 TOTAL SCORE: 18
7. FEELING AFRAID AS IF SOMETHING AWFUL MIGHT HAPPEN: MORE THAN HALF THE DAYS
GAD7 TOTAL SCORE: 18
6. BECOMING EASILY ANNOYED OR IRRITABLE: MORE THAN HALF THE DAYS
7. FEELING AFRAID AS IF SOMETHING AWFUL MIGHT HAPPEN: MORE THAN HALF THE DAYS
1. FEELING NERVOUS, ANXIOUS, OR ON EDGE: NEARLY EVERY DAY
4. TROUBLE RELAXING: NEARLY EVERY DAY
5. BEING SO RESTLESS THAT IT IS HARD TO SIT STILL: MORE THAN HALF THE DAYS

## 2023-10-31 ASSESSMENT — COLUMBIA-SUICIDE SEVERITY RATING SCALE - C-SSRS
LETHALITY/MEDICAL DAMAGE CODE FIRST POTENTIAL ATTEMPT: BEHAVIOR NOT LIKELY TO RESULT IN INJURY
1. HAVE YOU WISHED YOU WERE DEAD OR WISHED YOU COULD GO TO SLEEP AND NOT WAKE UP?: YES
5. HAVE YOU STARTED TO WORK OUT OR WORKED OUT THE DETAILS OF HOW TO KILL YOURSELF? DO YOU INTEND TO CARRY OUT THIS PLAN?: NO
TOTAL  NUMBER OF INTERRUPTED ATTEMPTS LIFETIME: NO
1. IN THE PAST MONTH, HAVE YOU WISHED YOU WERE DEAD OR WISHED YOU COULD GO TO SLEEP AND NOT WAKE UP?: YES
2. HAVE YOU ACTUALLY HAD ANY THOUGHTS OF KILLING YOURSELF?: YES
3. HAVE YOU BEEN THINKING ABOUT HOW YOU MIGHT KILL YOURSELF?: YES
REASONS FOR IDEATION LIFETIME: COMPLETELY TO END OR STOP THE PAIN (YOU COULDN'T GO ON LIVING WITH THE PAIN OR HOW YOU WERE FEELING)
TOTAL  NUMBER OF ACTUAL ATTEMPTS LIFETIME: 1
LETHALITY/MEDICAL DAMAGE CODE MOST LETHAL ACTUAL ATTEMPT: NO PHYSICAL DAMAGE OR VERY MINOR PHYSICAL DAMAGE
ATTEMPT LIFETIME: YES
LETHALITY/MEDICAL DAMAGE CODE MOST LETHAL POTENTIAL ATTEMPT: BEHAVIOR NOT LIKELY TO RESULT IN INJURY
4. HAVE YOU HAD THESE THOUGHTS AND HAD SOME INTENTION OF ACTING ON THEM?: YES
5. HAVE YOU STARTED TO WORK OUT OR WORKED OUT THE DETAILS OF HOW TO KILL YOURSELF? DO YOU INTEND TO CARRY OUT THIS PLAN?: YES
2. HAVE YOU ACTUALLY HAD ANY THOUGHTS OF KILLING YOURSELF?: YES
4. HAVE YOU HAD THESE THOUGHTS AND HAD SOME INTENTION OF ACTING ON THEM?: NO
TOTAL  NUMBER OF ABORTED OR SELF INTERRUPTED ATTEMPTS LIFETIME: NO
6. HAVE YOU EVER DONE ANYTHING, STARTED TO DO ANYTHING, OR PREPARED TO DO ANYTHING TO END YOUR LIFE?: NO
LETHALITY/MEDICAL DAMAGE CODE MOST RECENT POTENTIAL ATTEMPT: BEHAVIOR NOT LIKELY TO RESULT IN INJURY
ATTEMPT PAST THREE MONTHS: NO

## 2023-10-31 ASSESSMENT — PATIENT HEALTH QUESTIONNAIRE - PHQ9
SUM OF ALL RESPONSES TO PHQ QUESTIONS 1-9: 19
SUM OF ALL RESPONSES TO PHQ QUESTIONS 1-9: 19
10. IF YOU CHECKED OFF ANY PROBLEMS, HOW DIFFICULT HAVE THESE PROBLEMS MADE IT FOR YOU TO DO YOUR WORK, TAKE CARE OF THINGS AT HOME, OR GET ALONG WITH OTHER PEOPLE: EXTREMELY DIFFICULT

## 2023-10-31 NOTE — PROGRESS NOTES
[facility] Primary Care: Integrated Behavioral Health  2023    Behavioral Health Clinician Progress Note    Patient Name: Therese Salinas           Service Type:  Individual      Service Location:   MyChart / Email (patient reached)     Session Start Time: 1:07 p.m.  Session End Time: 2:00 p.m.      Session Length: 53 - 60      Attendees: Client     Service Modality:  Video Visit:      Provider verified identity through the following two step process.  Patient provided:  Patient photo and Patient     Telemedicine Visit: The patient's condition can be safely assessed and treated via synchronous audio and visual telemedicine encounter.      Reason for Telemedicine Visit: Services only offered telehealth    Originating Site (Patient Location): Patient's other [facility] room    Distant Site (Provider Location): [facility] MENTAL HEALTH & ADDICTION [facility]    Consent:  The patient/guardian has verbally consented to: the potential risks and benefits of telemedicine (video visit) versus in person care; bill my insurance or make self-payment for services provided; and responsibility for payment of non-covered services.     Patient would like the video invitation sent by:  My Chart    Mode of Communication:  Video Conference via [facility]    Distant Location (Provider):  Off-site    As the provider I attest to compliance with applicable laws and regulations related to telemedicine.    Visit Activities (Refresh list every visit): NEW and [facility] Only    Diagnostic Assessment Date: next visit  Treatment Plan Review Date: after DA  See Flowsheets for today's PHQ-9 and MONIQUE-7 results  Previous PHQ-9:       3/31/2023     2:14 PM 10/16/2023     8:43 AM 10/31/2023    11:04 AM   PHQ-9 SCORE   PHQ-9 Total Score Rafael 16 (Moderately severe depression) 17 (Moderately severe depression) 19 (Moderately severe depression)   PHQ-9 Total Score 16 17 19     Previous MONIQUE-7:       2021  "    9:26 AM 8/25/2022     9:08 AM 10/31/2023    11:15 AM   MONIQUE-7 SCORE   Total Score   18 (severe anxiety)   Total Score 21 14 18       YONG LEVEL:       No data to display                DATA  Extended Session (60+ minutes): No  Interactive Complexity: No  Crisis: No  [facility] Patient: No    Treatment Objective(s) Addressed in This Session:  Target Behavior(s): disease management/lifestyle changes suicidal ideation    Risk / Safety: will develop strategies for more effective management of risk issues    Current Stressors / Issues:  Pt is a 22-year-old  female who is in her third year at [employer], majoring in environmental studies.  Pt lives with her parents and two sisters there when not attending classes.   \"I just feel really low and am back and forth if it's worth it to continue.\"  Pt notes SI has been present for 1.5 weeks but has experienced SI in the past with one suicide attempt wherein she cut her wrists and subsequently hospitalized and PHP at [facility].   Pt also has significant anxiety and has for most of her life.    Pt protective factors: pt's twin sister has Lupus and may need her kidney sometime in future, afraid of \"going to hell,\" and afraid won't be able to complete it and have to be hospitalized and get further behind in school.      Created safety plan and sent to pt via My chart. Also reviewed [place] crisis resources and sent via My Chart.   Pt does have a safety plan from her stay at [facility] at age 16 (6 years ago).     Progress on Treatment Objective(s) / Homework:  N/a    Also provided psychoeducation about behavioral health condition, symptoms, and treatment options    Assessments completed prior to visit:  The following assessments were completed by patient for this visit:  PHQ9:       5/18/2021     1:48 PM 8/23/2021     8:44 AM 8/23/2021     9:26 AM 8/25/2022     8:48 AM 3/31/2023     2:14 PM 10/16/2023     8:43 AM 10/31/2023    11:04 AM   PHQ-9 SCORE   PHQ-9 Total Score " MyChart 15 (Moderately severe depression) 14 (Moderate depression)  21 (Severe depression) 16 (Moderately severe depression) 17 (Moderately severe depression) 19 (Moderately severe depression)   PHQ-9 Total Score 15 14 14 21 16 17 19     GAD7:       3/17/2021     3:00 PM 3/22/2021     3:25 PM 4/13/2021    12:56 PM 5/18/2021     1:48 PM 8/23/2021     9:26 AM 8/25/2022     9:08 AM 10/31/2023    11:15 AM   MONIQUE-7 SCORE   Total Score   12 (moderate anxiety) 14 (moderate anxiety)   18 (severe anxiety)   Total Score 13 12 12 14 21 14 18     CAGE-AID:       1/28/2021     6:00 PM 10/31/2023    11:17 AM   CAGE-AID Total Score   Total Score 0 0   Total Score MyChart  0 (A total score of 2 or greater is considered clinically significant)     PROMIS 10-Global Health (all questions and answers displayed):       10/31/2023    11:16 AM   PROMIS 10   In general, would you say your health is: Fair   In general, would you say your quality of life is: Fair   In general, how would you rate your physical health? Fair   In general, how would you rate your mental health, including your mood and your ability to think? Poor   In general, how would you rate your satisfaction with your social activities and relationships? Fair   In general, please rate how well you carry out your usual social activities and roles Fair   To what extent are you able to carry out your everyday physical activities such as walking, climbing stairs, carrying groceries, or moving a chair? Completely   In the past 7 days, how often have you been bothered by emotional problems such as feeling anxious, depressed, or irritable? Always   In the past 7 days, how would you rate your fatigue on average? Severe   In the past 7 days, how would you rate your pain on average, where 0 means no pain, and 10 means worst imaginable pain? 0   In general, would you say your health is: 2   In general, would you say your quality of life is: 2   In general, how would you rate your  physical health? 2   In general, how would you rate your mental health, including your mood and your ability to think? 1   In general, how would you rate your satisfaction with your social activities and relationships? 2   In general, please rate how well you carry out your usual social activities and roles. (This includes activities at home, at work and in your community, and responsibilities as a parent, child, spouse, employee, friend, etc.) 2   To what extent are you able to carry out your everyday physical activities such as walking, climbing stairs, carrying groceries, or moving a chair? 5   In the past 7 days, how often have you been bothered by emotional problems such as feeling anxious, depressed, or irritable? 5   In the past 7 days, how would you rate your fatigue on average? 4   In the past 7 days, how would you rate your pain on average, where 0 means no pain, and 10 means worst imaginable pain? 0   Global Mental Health Score 6   Global Physical Health Score 14   PROMIS TOTAL - SUBSCORES 20     [place] Suicide Severity Rating Scale (Lifetime/Recent)      1/28/2021     6:00 PM 10/31/2023     1:19 PM   [place] Suicide Severity Rating (Lifetime/Recent)   Q1 Wish to be Dead (Lifetime) Yes    Q2 Non-Specific Active Suicidal Thoughts (Lifetime) Yes    Most Severe Ideation Rating (Lifetime) 3    Frequency (Lifetime) 3    Duration (Lifetime) 4    Controllability (Lifetime) 3    Protective Factors  (Lifetime) 1    Reasons for Ideation (Lifetime) 5    RETIRED: 1. Wish to be Dead (Recent) No    Comments feelings of hopelessness    3. Active Suicidal Ideation with any Methods (Not Plan) Without Intent to Act (Lifetime) No    RETIRED: 3. Active Suicidal Ideation with any Methods (Not Plan) Without Intent to Act (Recent) No    RETIRE: 4. Active Suicidal Ideation with Some Intent to Act, Without Specific Plan (Lifetime) No    4. Active Suicidal Ideation with Some Intent to Act, Without Specific Plan (Recent) No     RETIRE: 5. Active Suicidal Ideation with Specific Plan and Intent (Lifetime) Yes    Comments plan to cut self    RETIRED: 5. Active Suicidal Ideation with Specific Plan and Intent (Recent) No    Most Severe Ideation Rating (Past Month) NA    Frequency (Past Month) NA    Duration (Past Month) NA    Controllability (Past Month) NA    Protective Factors (Past Month) NA    Reasons for Ideation (Past Month) NA    Actual Attempt (Lifetime) No    Actual Attempt (Past 3 Months) No    Has subject engaged in non-suicidal self-injurious behavior? (Lifetime) No    Has subject engaged in non-suicidal self-injurious behavior? (Past 3 Months) No    Interrupted Attempts (Lifetime) No    Interrupted Attempts (Past 3 Months) No    Aborted or Self-Interrupted Attempt (Lifetime) No    Aborted or Self-Interrupted Attempt (Past 3 Months) No    Preparatory Acts or Behavior (Lifetime) Yes    Comments thought to cut self when feeling hopeless    Preparatory Acts or Behavior (Past 3 Months) No    Comments 3 years ago age 17    Most Recent Attempt Actual Lethality Code 0    Most Recent Attempt Potential Lethality Code 1    Most Lethal Attempt Actual Lethality Code NA    Comments did not make attempt. Suicidal plan with knife    Initial/First Attempt Actual Lethality Code NA    Q1 Wish to be Dead (Lifetime)  Y   1. Wish to be Dead (Past 1 Month)  Y   Q2 Non-Specific Active Suicidal Thoughts (Lifetime)  Y   2. Non-Specific Active Suicidal Thoughts (Past 1 Month)  Y   3. Active Suicidal Ideation with any Methods (Not Plan) Without Intent to Act (Lifetime)  Y   Q3 Active Suicidal Ideation with any Methods (Not Plan) Without Intent to Act (Past 1 Month)  Y   Q4 Active Suicidal Ideation with Some Intent to Act, Without Specific Plan (Lifetime)  Y   4. Active Suicidal Ideation with Some Intent to Act, Without Specific Plan (Past 1 Month)  N   Q5 Active Suicidal Ideation with Specific Plan and Intent (Lifetime)  Y   5. Active Suicidal Ideation with  "Specific Plan and Intent (Past 1 Month)  N   Most Severe Ideation Rating (Lifetime)  5   Most Severe Ideation Rating (Past 1 Month)  2   Frequency (Lifetime)  4   Frequency (Past 1 Month)  2   Duration (Lifetime)  2   Duration (Past 1 Month)  2   Controllability (Lifetime)  2   Controllability (Past 1 Month)  2   Deterrents (Lifetime)  5   Deterrents (Past 1 Month)  1   Reasons for Ideation (Lifetime)  5   Actual Attempt (Lifetime)  Y   Total Number of Actual Attempts (Lifetime)  1   Actual Attempt Description (Lifetime)  \"cut my wrists\" at age 16- father found her and took her to the ED   Actual Attempt (Past 3 Months)  N   Has subject engaged in non-suicidal self-injurious behavior? (Lifetime)  N   Interrupted Attempts (Lifetime)  N   Aborted or Self-Interrupted Attempt (Lifetime)  N   Preparatory Acts or Behavior (Lifetime)  N   Potential Lethality Code (Most Recent Attempt)  0   Actual Lethality/Medical Damage Code (Most Lethal Attempt)  0   Potential Lethality Code (Most Lethal Attempt)  0   Potential Lethality Code (Initial/First Attempt)  0   Calculated C-SSRS Risk Score (Lifetime/Recent)  Moderate Risk       Care Plan review completed: No    Medication Review:  Changes to psychiatric medications, see updated Medication List in EPIC.     Medication Compliance:  No pt got another prescription for Sertraline filled. Hasn't yet picked it up.  Will do this week    Changes in Health Issues:   None reported    Chemical Use Review:   Substance Use: Problem use continues with no change since last session, Reviewed concerns related to health related substance abuse risk     Pt uses ETOH every other week anywhere from 1/2 bottle of wine to full bottle and two shots.  Pt drinks socially and sometimes drinks by herself. Pt denies THC use.     Tobacco Use: No current tobacco use.      Assessment: Current Emotional / Mental Status (status of significant symptoms):  Risk status (Self / Other harm or suicidal " ideation)  Patient has had a history of suicidal ideation: years ago and recent and suicide attempts: once when 16- cut wrists  Patient denies current fears or concerns for personal safety.  Patient reports the following current or recent suicidal ideation or behaviors: see above.  Patient denies current or recent homicidal ideation or behaviors.  Patient denies current or recent self injurious behavior or ideation.  Patient denies other safety concerns.  A safety and risk management plan has been developed including: Patient consented to co-developed safety plan.  A safety and risk management plan was completed.  Patient agreed to use safety plan should any safety concerns arise.  A copy was given to the patient.    Appearance:   Appropriate   Eye Contact:   Good   Psychomotor Behavior: Normal   Attitude:   Interested Pleasant  Orientation:   All  Speech   Rate / Production: Normal/ Responsive Normal    Volume:  Normal   Mood:    Sad   Affect:    Tearful  Thought Content:  Clear   Thought Form:  Coherent  Logical   Insight:    Fair     Diagnoses:  1. Generalized anxiety disorder        Collateral Reports Completed:  Not Applicable    Plan: (Homework, other):  Patient was given information about behavioral services and encouraged to schedule a follow up appointment with the clinic [facility] in 2 weeks.  She was also given information about mental health symptoms and treatment options  and safety plan and [place] resources for mental health .  CD Recommendations:  will discuss next visit .       Yolanda Serrano, Northern Light Mercy HospitalSW  October 31, 2023

## 2023-11-13 ENCOUNTER — VIRTUAL VISIT (OUTPATIENT)
Dept: BEHAVIORAL HEALTH | Facility: CLINIC | Age: 23
End: 2023-11-13
Payer: COMMERCIAL

## 2023-11-13 DIAGNOSIS — F41.1 GENERALIZED ANXIETY DISORDER: Primary | ICD-10-CM

## 2023-11-13 PROCEDURE — 90834 PSYTX W PT 45 MINUTES: CPT | Mod: 95 | Performed by: SOCIAL WORKER

## 2023-11-13 NOTE — Clinical Note
emani Carter. This pt was transferred to me due to her [facility] in clinic leaving [facility].  I can help her find appropriate level of care- she is struggling quite a bit and has been taking Sertraline for only 1-2 weeks. Doesn't like the way it makes her feel (see note).  I don't know if she'll continue taking it.  We did create a safety plan first viist, as she is experiencing SI.  Reports not as severe this week. She will reach out to [place] to determine mental health suports there, as well. Yolanda Nieto

## 2023-11-13 NOTE — PROGRESS NOTES
[facility] [place] [facility]: Integrated Behavioral Health  2023    Behavioral Health Clinician Progress Note    Patient Name: Therese Salinas           Service Type:  Individual      Service Location:   MyChart / Email (patient reached)     Session Start Time: 4:02 p.m.  Session End Time: 4:40 p.m.      Session Length: 38 - 52      Attendees: Client     Service Modality:  Video Visit:      Provider verified identity through the following two step process.  Patient provided:  Patient photo and Patient     Telemedicine Visit: The patient's condition can be safely assessed and treated via synchronous audio and visual telemedicine encounter.      Reason for Telemedicine Visit: Services only offered telehealth    Originating Site (Patient Location): Patient's other [facility] room    Distant Site (Provider Location): [facility] MENT[facility] HE[facility] & [facility]    Consent:  The patient/guardian has verbally consented to: the potential risks and benefits of telemedicine (video visit) versus in person care; bill my insurance or make self-payment for services provided; and responsibility for payment of non-covered services.     Patient would like the video invitation sent by:  My Chart    Mode of Communication:  Video Conference via [facility]    Distant Location (Provider):  Off-site    As the provider I attest to compliance with applicable laws and regulations related to telemedicine.    Visit Activities (Refresh list every visit): [facility]    Diagnostic Assessment Date: n/a- referring out  Treatment Plan Review Date: after DA  See Flowsheets for today's PHQ-9 and MONIQUE-7 results  Previous PHQ-9:       3/31/2023     2:14 PM 10/16/2023     8:43 AM 10/31/2023    11:04 AM   PHQ-9 SCORE   PHQ-9 Total Score Rafael 16 (Moderately severe depression) 17 (Moderately severe depression) 19 (Moderately severe depression)   PHQ-9 Total Score 16 17 19     Previous MONIQUE-7:       2021      "9:26 AM 8/25/2022     9:08 AM 10/31/2023    11:15 AM   MONIQUE-7 SCORE   Total Score   18 (severe anxiety)   Total Score 21 14 18       YONG LEVEL:       No data to display                DATA  Extended Session (60+ minutes): No  Interactive Complexity: No  Crisis: No  [facility] Patient: No    Treatment Objective(s) Addressed in This Session:  Target Behavior(s): disease management/lifestyle changes suicidal ideation    Risk / Safety: will develop strategies for more effective management of risk issues    Current Stressors / Issues:       Pt reportedly feeling a bit better. She has been taking Sertraline for 1-2 weeks and feeling like it's doing something but definitely not happy.  Pt has been feeling still helplessness but not as much so.  \"Constantly feeling stressed, motivation is very low.\"  Pt gets 7-8 hours of sleep but still fatigued.     Pt has history of going on and then stopping it  due to \"numbness, disinterest\" when goes on Sertraline.  Pt has history of Anorexia Nervosa with Purging and Restricting tendencies when she was age 16 or 17.   Pt notes she hasn't had more thoughts of suicide- just very difficult to feel hopeful.    Reviewed [facility] role and [facility] for community therapist- pt could benefit from regular therapeutic inverventions.  Discussed PHP potential, as well.     Progress on Treatment Objective(s) / Homework:  N/a    Also provided psychoeducation about behavioral health condition, symptoms, and treatment options    Assessments completed prior to visit:  The following assessments were completed by patient for this visit:  PHQ9:       5/18/2021     1:48 PM 8/23/2021     8:44 AM 8/23/2021     9:26 AM 8/25/2022     8:48 AM 3/31/2023     2:14 PM 10/16/2023     8:43 AM 10/31/2023    11:04 AM   PHQ-9 SCORE   PHQ-9 Total Score MyChart 15 (Moderately severe depression) 14 (Moderate depression)  21 (Severe depression) 16 (Moderately severe depression) 17 (Moderately severe depression) 19 (Moderately " severe depression)   PHQ-9 Total Score 15 14 14 21 16 17 19     GAD7:       3/17/2021     3:00 PM 3/22/2021     3:25 PM 4/13/2021    12:56 PM 5/18/2021     1:48 PM 8/23/2021     9:26 AM 8/25/2022     9:08 AM 10/31/2023    11:15 AM   MONIQUE-7 SCORE   Total Score   12 (moderate anxiety) 14 (moderate anxiety)   18 (severe anxiety)   Total Score 13 12 12 14 21 14 18     CAGE-AID:       1/28/2021     6:00 PM 10/31/2023    11:17 AM   CAGE-AID Total Score   Total Score 0 0   Total Score MyChart  0 (A total score of 2 or greater is considered clinically significant)     PROMIS 10-Global Health (all questions and answers displayed):       10/31/2023    11:16 AM   PROMIS 10   In general, would you say your health is: Fair   In general, would you say your quality of life is: Fair   In general, how would you rate your physical health? Fair   In general, how would you rate your mental health, including your mood and your ability to think? Poor   In general, how would you rate your satisfaction with your social activities and relationships? Fair   In general, please rate how well you carry out your usual social activities and roles Fair   To what extent are you able to carry out your everyday physical activities such as walking, climbing stairs, carrying groceries, or moving a chair? Completely   In the past 7 days, how often have you been bothered by emotional problems such as feeling anxious, depressed, or irritable? Always   In the past 7 days, how would you rate your fatigue on average? Severe   In the past 7 days, how would you rate your pain on average, where 0 means no pain, and 10 means worst imaginable pain? 0   In general, would you say your health is: 2   In general, would you say your quality of life is: 2   In general, how would you rate your physical health? 2   In general, how would you rate your mental health, including your mood and your ability to think? 1   In general, how would you rate your satisfaction with  your social activities and relationships? 2   In general, please rate how well you carry out your usual social activities and roles. (This includes activities at home, at work and in your community, and responsibilities as a parent, child, spouse, employee, friend, etc.) 2   To what extent are you able to carry out your everyday physical activities such as walking, climbing stairs, carrying groceries, or moving a chair? 5   In the past 7 days, how often have you been bothered by emotional problems such as feeling anxious, depressed, or irritable? 5   In the past 7 days, how would you rate your fatigue on average? 4   In the past 7 days, how would you rate your pain on average, where 0 means no pain, and 10 means worst imaginable pain? 0   Global Mental Health Score 6   Global Physical Health Score 14   PROMIS TOTAL - SUBSCORES 20     [place] Suicide Severity Rating Scale (Lifetime/Recent)      1/28/2021     6:00 PM 10/31/2023     1:19 PM   [place] Suicide Severity Rating (Lifetime/Recent)   Q1 Wish to be Dead (Lifetime) Yes    Q2 Non-Specific Active Suicidal Thoughts (Lifetime) Yes    Most Severe Ideation Rating (Lifetime) 3    Frequency (Lifetime) 3    Duration (Lifetime) 4    Controllability (Lifetime) 3    Protective Factors  (Lifetime) 1    Reasons for Ideation (Lifetime) 5    RETIRED: 1. Wish to be Dead (Recent) No    Comments feelings of hopelessness    3. Active Suicidal Ideation with any Methods (Not Plan) Without Intent to Act (Lifetime) No    RETIRED: 3. Active Suicidal Ideation with any Methods (Not Plan) Without Intent to Act (Recent) No    RETIRE: 4. Active Suicidal Ideation with Some Intent to Act, Without Specific Plan (Lifetime) No    4. Active Suicidal Ideation with Some Intent to Act, Without Specific Plan (Recent) No    RETIRE: 5. Active Suicidal Ideation with Specific Plan and Intent (Lifetime) Yes    Comments plan to cut self    RETIRED: 5. Active Suicidal Ideation with Specific Plan and  Intent (Recent) No    Most Severe Ideation Rating (Past Month) NA    Frequency (Past Month) NA    Duration (Past Month) NA    Controllability (Past Month) NA    Protective Factors (Past Month) NA    Reasons for Ideation (Past Month) NA    Actual Attempt (Lifetime) No    Actual Attempt (Past 3 Months) No    Has subject engaged in non-suicidal self-injurious behavior? (Lifetime) No    Has subject engaged in non-suicidal self-injurious behavior? (Past 3 Months) No    Interrupted Attempts (Lifetime) No    Interrupted Attempts (Past 3 Months) No    Aborted or Self-Interrupted Attempt (Lifetime) No    Aborted or Self-Interrupted Attempt (Past 3 Months) No    Preparatory Acts or Behavior (Lifetime) Yes    Comments thought to cut self when feeling hopeless    Preparatory Acts or Behavior (Past 3 Months) No    Comments 3 years ago age 17    Most Recent Attempt Actual Lethality Code 0    Most Recent Attempt Potential Lethality Code 1    Most Lethal Attempt Actual Lethality Code NA    Comments did not make attempt. Suicidal plan with knife    Initial/First Attempt Actual Lethality Code NA    Q1 Wish to be Dead (Lifetime)  Y   1. Wish to be Dead (Past 1 Month)  Y   Q2 Non-Specific Active Suicidal Thoughts (Lifetime)  Y   2. Non-Specific Active Suicidal Thoughts (Past 1 Month)  Y   3. Active Suicidal Ideation with any Methods (Not Plan) Without Intent to Act (Lifetime)  Y   Q3 Active Suicidal Ideation with any Methods (Not Plan) Without Intent to Act (Past 1 Month)  Y   Q4 Active Suicidal Ideation with Some Intent to Act, Without Specific Plan (Lifetime)  Y   4. Active Suicidal Ideation with Some Intent to Act, Without Specific Plan (Past 1 Month)  N   Q5 Active Suicidal Ideation with Specific Plan and Intent (Lifetime)  Y   5. Active Suicidal Ideation with Specific Plan and Intent (Past 1 Month)  N   Most Severe Ideation Rating (Lifetime)  5   Most Severe Ideation Rating (Past 1 Month)  2   Frequency (Lifetime)  4   Frequency  "(Past 1 Month)  2   Duration (Lifetime)  2   Duration (Past 1 Month)  2   Controllability (Lifetime)  2   Controllability (Past 1 Month)  2   Deterrents (Lifetime)  5   Deterrents (Past 1 Month)  1   Reasons for Ideation (Lifetime)  5   Actual Attempt (Lifetime)  Y   Total Number of Actual Attempts (Lifetime)  1   Actual Attempt Description (Lifetime)  \"cut my wrists\" at age 16- father found her and took her to the ED   Actual Attempt (Past 3 Months)  N   Has subject engaged in non-suicidal self-injurious behavior? (Lifetime)  N   Interrupted Attempts (Lifetime)  N   Aborted or Self-Interrupted Attempt (Lifetime)  N   Preparatory Acts or Behavior (Lifetime)  N   Potential Lethality Code (Most Recent Attempt)  0   Actual Lethality/Medical Damage Code (Most Lethal Attempt)  0   Potential Lethality Code (Most Lethal Attempt)  0   Potential Lethality Code (Initial/First Attempt)  0   Calculated C-SSRS Risk Score (Lifetime/Recent)  Moderate Risk       Care Plan review completed: No    Medication Review:  Changes to psychiatric medications, see updated Medication List in EPIC.     Medication Compliance:  No pt got another prescription for Sertraline filled. Hasn't yet picked it up.  Will do this week    Changes in Health Issues:   None reported    Chemical Use Review:   Substance Use: Problem use continues with no change since last session, Reviewed concerns related to health related substance abuse risk     Pt uses ETOH every other week anywhere from 1/2 bottle of wine to full bottle and two shots.  Pt drinks socially and sometimes drinks by herself. Pt denies THC use.     Tobacco Use: No current tobacco use.      Assessment: Current Emotional / Mental Status (status of significant symptoms):  Risk status (Self / Other harm or suicidal ideation)  Patient has had a history of suicidal ideation: years ago and recent and suicide attempts: once when 16- cut wrists  Patient denies current fears or concerns for personal " safety.  Patient reports the following current or recent suicidal ideation or behaviors: see above.  Patient denies current or recent homicidal ideation or behaviors.  Patient denies current or recent self injurious behavior or ideation.  Patient denies other safety concerns.  A safety and risk management plan has been developed including: Patient consented to co-developed safety plan.  A safety and risk management plan was completed.  Patient agreed to use safety plan should any safety concerns arise.  A copy was given to the patient.    Appearance:   Appropriate   Eye Contact:   Good   Psychomotor Behavior: Normal   Attitude:   Interested Pleasant  Orientation:   All  Speech   Rate / Production: Normal/ Responsive Normal    Volume:  Normal   Mood:    Sad   Affect:    Blunted   Thought Content:  Clear   Thought Form:  Coherent  Logical   Insight:    Fair     Diagnoses:  1. Generalized anxiety disorder        Collateral Reports Completed:  Not Applicable    Plan: (Homework, other):  Patient was given information about behavioral services and encouraged to schedule a follow up appointment with the clinic [facility] in 2 weeks.  She was also given information about mental health symptoms and treatment options  and safety plan and [place] resources for mental health .  CD Recommendations:  will discuss next visit .       Yolanda Serrano, RAOUL  October 31, 2023

## 2023-11-21 ENCOUNTER — VIRTUAL VISIT (OUTPATIENT)
Dept: BEHAVIORAL HEALTH | Facility: CLINIC | Age: 23
End: 2023-11-21
Payer: COMMERCIAL

## 2023-11-21 ENCOUNTER — TELEPHONE (OUTPATIENT)
Dept: FAMILY MEDICINE | Facility: CLINIC | Age: 23
End: 2023-11-21
Payer: COMMERCIAL

## 2023-11-21 DIAGNOSIS — F41.1 GENERALIZED ANXIETY DISORDER: Primary | ICD-10-CM

## 2023-11-21 DIAGNOSIS — F32.A DEPRESSION, UNSPECIFIED DEPRESSION TYPE: ICD-10-CM

## 2023-11-21 PROCEDURE — 90832 PSYTX W PT 30 MINUTES: CPT | Mod: 95 | Performed by: SOCIAL WORKER

## 2023-11-21 NOTE — TELEPHONE ENCOUNTER
Called patient. Left voice message to return call at [phone].    When patient returns call, please inform her of provider's message as written:      Received message from therapist Yolanda Serrano.   Pt is home for Thanksgiving holiday and is still really depressed.  She  has been taking the 50 mgs but can't break the 100 mg pills well enough to go up to 75 mgs. She's wondering if she should go up to 100 mgs.  I will message you this after my visit with her but wanted to give you a heads up.           Call patient. She can go up to 100 mg. Have her schedule a video visit with me next week please.      REID Graham BSN RN  [facility]

## 2023-11-21 NOTE — PROGRESS NOTES
[facility]: Integrated Behavioral Health  2023    Behavioral Health Clinician Progress Note    Patient Name: Therese Salinas           Service Type:  Individual      Service Location:   MyChart / Email (patient reached)     Session Start Time: 1:35 p.m.  Session End Time: 2:00  p.m.      Session Length: 16 - 37      Attendees: Client     Service Modality:  Video Visit:      Provider verified identity through the following two step process.  Patient provided:  Patient photo and Patient     Telemedicine Visit: The patient's condition can be safely assessed and treated via synchronous audio and visual telemedicine encounter.      Reason for Telemedicine Visit: Services only offered telehealth    Originating Site (Patient Location): Patient's other home    Distant Site (Provider Location): [facility]    Consent:  The patient/guardian has verbally consented to: the potential risks and benefits of telemedicine (video visit) versus in person care; bill my insurance or make self-payment for services provided; and responsibility for payment of non-covered services.     Patient would like the video invitation sent by:  My Chart    Mode of Communication:  Video Conference via [facility]    Distant Location (Provider):  Off-site    As the provider I attest to compliance with applicable laws and regulations related to telemedicine.    Visit Activities (Refresh list every visit): [facility] Only    Diagnostic Assessment Date: n/a- referring out  Treatment Plan Review Date: after DA  See Flowsheets for today's PHQ-9 and MONIQUE-7 results  Previous PHQ-9:       3/31/2023     2:14 PM 10/16/2023     8:43 AM 10/31/2023    11:04 AM   PHQ-9 SCORE   PHQ-9 Total Score Rafael 16 (Moderately severe depression) 17 (Moderately severe depression) 19 (Moderately severe depression)   PHQ-9 Total Score 16 17 19     Previous MONIQUE-7:       2021     9:26  "AM 8/25/2022     9:08 AM 10/31/2023    11:15 AM   MONIQUE-7 SCORE   Total Score   18 (severe anxiety)   Total Score 21 14 18       YONG LEVEL:       No data to display                DATA  Extended Session (60+ minutes): No  Interactive Complexity: No  Crisis: No  [facility] Patient: No    Treatment Objective(s) Addressed in This Session:  Target Behavior(s): disease management/lifestyle changes suicidal ideation    Risk / Safety: will develop strategies for more effective management of risk issues    Current Stressors / Issues:     Pt is back at home for the holiday.  Pt continues to feel fatigued with adequate sleep, low motivation, anhedonia, passive suicidal ideation with no plan, feelings of purposelessness, mood is depressed.  Discussed importance of pushing self to get outside, get heart rate up. Pt admittedly is having difficulty getting herself to do much for self-care.  Pt has her two sisters at home that are still in school, so difficult to find time with them, but will be home with  her family until Sunday.  Pt did call [facility] and they offered \"[facility]\" virtual counseling.  Pt is considering this vs writer reaching out to former [facility] therapist pt saw last year for several sessions. We will connect in two weeks to check back with how pt is doing.  Writer will reach out to PCP regarding medications.     Progress on Treatment Objective(s) / Homework:  Minimal progress- did talk to [facility] and may do their \"[facility]\" virtual counseling     Also provided psychoeducation about behavioral health condition, symptoms, and treatment options    Assessments completed prior to visit:  The following assessments were completed by patient for this visit:  PHQ9:       5/18/2021     1:48 PM 8/23/2021     8:44 AM 8/23/2021     9:26 AM 8/25/2022     8:48 AM 3/31/2023     2:14 PM 10/16/2023     8:43 AM 10/31/2023    11:04 AM   PHQ-9 SCORE   PHQ-9 Total Score MyChart 15 (Moderately severe depression) 14 (Moderate depression)  21 " (Severe depression) 16 (Moderately severe depression) 17 (Moderately severe depression) 19 (Moderately severe depression)   PHQ-9 Total Score 15 14 14 21 16 17 19     GAD7:       3/17/2021     3:00 PM 3/22/2021     3:25 PM 4/13/2021    12:56 PM 5/18/2021     1:48 PM 8/23/2021     9:26 AM 8/25/2022     9:08 AM 10/31/2023    11:15 AM   MONIQUE-7 SCORE   Total Score   12 (moderate anxiety) 14 (moderate anxiety)   18 (severe anxiety)   Total Score 13 12 12 14 21 14 18     CAGE-AID:       1/28/2021     6:00 PM 10/31/2023    11:17 AM   CAGE-AID Total Score   Total Score 0 0   Total Score MyChart  0 (A total score of 2 or greater is considered clinically significant)     PROMIS 10-Global Health (all questions and answers displayed):       10/31/2023    11:16 AM   PROMIS 10   In general, would you say your health is: Fair   In general, would you say your quality of life is: Fair   In general, how would you rate your physical health? Fair   In general, how would you rate your mental health, including your mood and your ability to think? Poor   In general, how would you rate your satisfaction with your social activities and relationships? Fair   In general, please rate how well you carry out your usual social activities and roles Fair   To what extent are you able to carry out your everyday physical activities such as walking, climbing stairs, carrying groceries, or moving a chair? Completely   In the past 7 days, how often have you been bothered by emotional problems such as feeling anxious, depressed, or irritable? Always   In the past 7 days, how would you rate your fatigue on average? Severe   In the past 7 days, how would you rate your pain on average, where 0 means no pain, and 10 means worst imaginable pain? 0   In general, would you say your health is: 2   In general, would you say your quality of life is: 2   In general, how would you rate your physical health? 2   In general, how would you rate your mental health,  including your mood and your ability to think? 1   In general, how would you rate your satisfaction with your social activities and relationships? 2   In general, please rate how well you carry out your usual social activities and roles. (This includes activities at home, at work and in your community, and responsibilities as a parent, child, spouse, employee, friend, etc.) 2   To what extent are you able to carry out your everyday physical activities such as walking, climbing stairs, carrying groceries, or moving a chair? 5   In the past 7 days, how often have you been bothered by emotional problems such as feeling anxious, depressed, or irritable? 5   In the past 7 days, how would you rate your fatigue on average? 4   In the past 7 days, how would you rate your pain on average, where 0 means no pain, and 10 means worst imaginable pain? 0   Global Mental Health Score 6   Global Physical Health Score 14   PROMIS TOTAL - SUBSCORES 20     [place] Suicide Severity Rating Scale (Lifetime/Recent)      1/28/2021     6:00 PM 10/31/2023     1:19 PM   [place] Suicide Severity Rating (Lifetime/Recent)   Q1 Wish to be Dead (Lifetime) Yes    Q2 Non-Specific Active Suicidal Thoughts (Lifetime) Yes    Most Severe Ideation Rating (Lifetime) 3    Frequency (Lifetime) 3    Duration (Lifetime) 4    Controllability (Lifetime) 3    Protective Factors  (Lifetime) 1    Reasons for Ideation (Lifetime) 5    RETIRED: 1. Wish to be Dead (Recent) No    Comments feelings of hopelessness    3. Active Suicidal Ideation with any Methods (Not Plan) Without Intent to Act (Lifetime) No    RETIRED: 3. Active Suicidal Ideation with any Methods (Not Plan) Without Intent to Act (Recent) No    RETIRE: 4. Active Suicidal Ideation with Some Intent to Act, Without Specific Plan (Lifetime) No    4. Active Suicidal Ideation with Some Intent to Act, Without Specific Plan (Recent) No    RETIRE: 5. Active Suicidal Ideation with Specific Plan and Intent  (Lifetime) Yes    Comments plan to cut self    RETIRED: 5. Active Suicidal Ideation with Specific Plan and Intent (Recent) No    Most Severe Ideation Rating (Past Month) NA    Frequency (Past Month) NA    Duration (Past Month) NA    Controllability (Past Month) NA    Protective Factors (Past Month) NA    Reasons for Ideation (Past Month) NA    Actual Attempt (Lifetime) No    Actual Attempt (Past 3 Months) No    Has subject engaged in non-suicidal self-injurious behavior? (Lifetime) No    Has subject engaged in non-suicidal self-injurious behavior? (Past 3 Months) No    Interrupted Attempts (Lifetime) No    Interrupted Attempts (Past 3 Months) No    Aborted or Self-Interrupted Attempt (Lifetime) No    Aborted or Self-Interrupted Attempt (Past 3 Months) No    Preparatory Acts or Behavior (Lifetime) Yes    Comments thought to cut self when feeling hopeless    Preparatory Acts or Behavior (Past 3 Months) No    Comments 3 years ago age 17    Most Recent Attempt Actual Lethality Code 0    Most Recent Attempt Potential Lethality Code 1    Most Lethal Attempt Actual Lethality Code NA    Comments did not make attempt. Suicidal plan with knife    Initial/First Attempt Actual Lethality Code NA    Q1 Wish to be Dead (Lifetime)  Y   1. Wish to be Dead (Past 1 Month)  Y   Q2 Non-Specific Active Suicidal Thoughts (Lifetime)  Y   2. Non-Specific Active Suicidal Thoughts (Past 1 Month)  Y   3. Active Suicidal Ideation with any Methods (Not Plan) Without Intent to Act (Lifetime)  Y   Q3 Active Suicidal Ideation with any Methods (Not Plan) Without Intent to Act (Past 1 Month)  Y   Q4 Active Suicidal Ideation with Some Intent to Act, Without Specific Plan (Lifetime)  Y   4. Active Suicidal Ideation with Some Intent to Act, Without Specific Plan (Past 1 Month)  N   Q5 Active Suicidal Ideation with Specific Plan and Intent (Lifetime)  Y   5. Active Suicidal Ideation with Specific Plan and Intent (Past 1 Month)  N   Most Severe Ideation  "Rating (Lifetime)  5   Most Severe Ideation Rating (Past 1 Month)  2   Frequency (Lifetime)  4   Frequency (Past 1 Month)  2   Duration (Lifetime)  2   Duration (Past 1 Month)  2   Controllability (Lifetime)  2   Controllability (Past 1 Month)  2   Deterrents (Lifetime)  5   Deterrents (Past 1 Month)  1   Reasons for Ideation (Lifetime)  5   Actual Attempt (Lifetime)  Y   Total Number of Actual Attempts (Lifetime)  1   Actual Attempt Description (Lifetime)  \"cut my wrists\" at age 16- father found her and took her to the ED   Actual Attempt (Past 3 Months)  N   Has subject engaged in non-suicidal self-injurious behavior? (Lifetime)  N   Interrupted Attempts (Lifetime)  N   Aborted or Self-Interrupted Attempt (Lifetime)  N   Preparatory Acts or Behavior (Lifetime)  N   Potential Lethality Code (Most Recent Attempt)  0   Actual Lethality/Medical Damage Code (Most Lethal Attempt)  0   Potential Lethality Code (Most Lethal Attempt)  0   Potential Lethality Code (Initial/First Attempt)  0   Calculated C-SSRS Risk Score (Lifetime/Recent)  Moderate Risk     Care Plan review completed: No    Medication Review:  Changes to psychiatric medications, see updated Medication List in EPIC.     Medication Compliance:  Yes pt has been taking 50 mgs and can't cut pill in another half to take 75 mgs so has questions about this. Writer has messaged PCP    Changes in Health Issues:   None reported    Chemical Use Review:   Substance Use: Problem use continues with no change since last session, Reviewed concerns related to health related substance abuse risk     Pt uses ETOH every other week anywhere from 1/2 bottle of wine to full bottle and two shots.  Pt drinks socially and sometimes drinks by herself. Pt denies THC use.     Tobacco Use: No current tobacco use.      Assessment: Current Emotional / Mental Status (status of significant symptoms):  Risk status (Self / Other harm or suicidal ideation)  Patient has had a history of suicidal " ideation: years ago and recent and suicide attempts: once when 16- cut wrists  Patient denies current fears or concerns for personal safety.  Patient reports the following current or recent suicidal ideation or behaviors: see above.  Patient denies current or recent homicidal ideation or behaviors.  Patient denies current or recent self injurious behavior or ideation.  Patient denies other safety concerns.  A safety and risk management plan has been developed including: Patient consented to co-developed safety plan.  A safety and risk management plan was completed.  Patient agreed to use safety plan should any safety concerns arise.  A copy was given to the patient.    Appearance:   Appropriate   Eye Contact:   Good   Psychomotor Behavior: Normal   Attitude:   Interested Pleasant  Orientation:   All  Speech   Rate / Production: Normal/ Responsive Normal    Volume:  Normal   Mood:    Depressed   Affect:    Blunted   Thought Content:  Clear   Thought Form:  Coherent  Logical   Insight:    Fair     Diagnoses:  1. Generalized anxiety disorder    2. Depression, unspecified depression type        Collateral Reports Completed:  Not Applicable    Plan: (Homework, other):  Patient was given information about behavioral services and encouraged to schedule a follow up appointment with the clinic [facility] in 2 weeks to check in about therapy and symptoms. She was also given information about mental health symptoms and treatment options  and safety plan and [place] resources for mental health .  CD Recommendations:  will discuss next visit .       Yolanda Serrano, GOLDYSW  October 31, 2023

## 2023-11-21 NOTE — TELEPHONE ENCOUNTER
Received message from therapist Yolanda Serrano.   Pt is home for Thanksgiving holiday and is still really depressed.  She  has been taking the 50 mgs but can't break the 100 mg pills well enough to go up to 75 mgs. She's wondering if she should go up to 100 mgs.  I will message you this after my visit with her but wanted to give you a heads up.        Call patient. She can go up to 100 mg. Have her schedule a video visit with me next week please.     Gauri Dickens PA-C

## 2023-11-22 NOTE — TELEPHONE ENCOUNTER
Message below reviewed with pt. Assisted with scheduling video visit for 11/30.    Yolanda Portillo RN  [facility]

## 2023-11-29 NOTE — PROGRESS NOTES
"Therese is a 23 year old who is being evaluated via a billable video visit.      How would you like to obtain your AVS? MyChart  If the video visit is dropped, the invitation should be resent by: Text to cell phone: [phone]  Will anyone else be joining your video visit? No          Assessment & Plan     Generalized anxiety disorder  Major Depressive disorder (H)  Patient not well controlled. But too early to start a new medication. Had previously done well with sertraline. Suggest we try hydroxyzine in the meantime to help with significant anxiety - especially going into the end of the quarter at school and holidays. I'd like to see her again in 4 weeks - she is agreeable.    - hydrOXYzine (ATARAX) 25 MG tablet; Take 1-2 tablets (25-50 mg) by mouth 3 times daily as needed for anxiety             BMI:   Estimated body mass index is 32.93 kg/m  as calculated from the following:    Height as of 10/16/23: 1.676 m (5' 6\").    Weight as of 10/16/23: 92.5 kg (204 lb).       Depression Screening Follow Up        11/30/2023     2:25 PM   PHQ   PHQ-9 Total Score 17   Q9: Thoughts of better off dead/self-harm past 2 weeks Not at all         Follow Up Actions Taken  Referred patient back to current mental health provider.  Adjusted patient's anti-depressant medication.         REID [facility]    Mega Saleh is a 23 year old, presenting for the following health issues:  Recheck Medication      HPI     Depression Followup  How are you doing with your depression since your last visit? Worsened   Are you having other symptoms that might be associated with depression? Yes:  feeling hopeless.   Have you had a significant life event?  No   Are you feeling anxious or having panic attacks?   Yes   Do you have any concerns with your use of alcohol or other drugs? No      Restarted sertraline 50 mg - 2 weeks in. Just started 100 mg. Does feel a bit better.   No suicidal thoughts. Feels " safe at home.       Social History     Tobacco Use    Smoking status: Never     Passive exposure: Never    Smokeless tobacco: Never   Vaping Use    Vaping Use: Never used   Substance Use Topics    Alcohol use: Never    Drug use: Never         10/16/2023     8:43 AM 10/31/2023    11:04 AM 11/30/2023     2:25 PM   PHQ   PHQ-9 Total Score 17 19 17   Q9: Thoughts of better off dead/self-harm past 2 weeks Several days Several days Not at all   F/U: Thoughts of suicide or self-harm Yes Yes    F/U: Self harm-plan No No    F/U: Self-harm action No No    F/U: Safety concerns No No          8/25/2022     9:08 AM 10/31/2023    11:15 AM 11/30/2023     2:25 PM   MONIQUE-7 SCORE   Total Score  18 (severe anxiety)    Total Score 14 18 13         11/30/2023     2:25 PM   Last PHQ-9   1.  Little interest or pleasure in doing things 3   2.  Feeling down, depressed, or hopeless 2   3.  Trouble falling or staying asleep, or sleeping too much 2   4.  Feeling tired or having little energy 3   5.  Poor appetite or overeating 1   6.  Feeling bad about yourself 2   7.  Trouble concentrating 3   8.  Moving slowly or restless 1   Q9: Thoughts of better off dead/self-harm past 2 weeks 0   PHQ-9 Total Score 17   Difficulty at work, home, or with people Extremely dIfficult         11/30/2023     2:25 PM   MONIQUE-7    1. Feeling nervous, anxious, or on edge 3   2. Not being able to stop or control worrying 3   3. Worrying too much about different things 2   4. Trouble relaxing 2   5. Being so restless that it is hard to sit still 1   6. Becoming easily annoyed or irritable 1   7. Feeling afraid, as if something awful might happen 1   MONIQUE-7 Total Score 13   If you checked any problems, how difficult have they made it for you to do your work, take care of things at home, or get along with other people? Extremely difficult       Suicide Assessment Five-step Evaluation and Treatment (SAFE-T)    Asthma Follow-Up    Was ACT completed today?  Yes         11/30/2023     2:25 PM   ACT Total Scores   ACT TOTAL SCORE (Goal Greater than or Equal to 20) 20   In the past 12 months, how many times did you visit the emergency room for your asthma without being admitted to the hospital? 0   In the past 12 months, how many times were you hospitalized overnight because of your asthma? 0        How many days per week do you miss taking your asthma controller medication?  I do not have an asthma controller medication  Please describe any recent triggers for your asthma: exercise or sports and laughing   Have you had any Emergency Room Visits, Urgent Care Visits, or Hospital Admissions since your last office visit?  No          Review of Systems         Objective    Vitals - Patient Reported  Pain Score: No Pain (0)      Vitals:  No vitals were obtained today due to virtual visit.    Physical Exam   GENERAL: Healthy, alert and no distress  EYES: Eyes grossly normal to inspection.  No discharge or erythema, or obvious scleral/conjunctival abnormalities.  RESP: No audible wheeze, cough, or visible cyanosis.  No visible retractions or increased work of breathing.    SKIN: Visible skin clear. No significant rash, abnormal pigmentation or lesions.  NEURO: Cranial nerves grossly intact.  Mentation and speech appropriate for age.  PSYCH: Mentation appears normal, affect normal/bright, judgement and insight intact, normal speech and appearance well-groomed.                Video-Visit Details    Type of service:  Video Visit   Video Start Time: 3:28 PM  Video End Time:3:48 PM    Originating Location (pt. Location): Home    Distant Location (provider location):  On-site  Platform used for Video Visit: Sp

## 2023-11-30 ENCOUNTER — VIRTUAL VISIT (OUTPATIENT)
Dept: FAMILY MEDICINE | Facility: CLINIC | Age: 23
End: 2023-11-30
Payer: COMMERCIAL

## 2023-11-30 DIAGNOSIS — F41.1 GENERALIZED ANXIETY DISORDER: Primary | ICD-10-CM

## 2023-11-30 DIAGNOSIS — F33.1 MAJOR DEPRESSIVE DISORDER, RECURRENT EPISODE, MODERATE (H): ICD-10-CM

## 2023-11-30 PROCEDURE — 96127 BRIEF EMOTIONAL/BEHAV ASSMT: CPT | Performed by: PHYSICIAN ASSISTANT

## 2023-11-30 PROCEDURE — 99214 OFFICE O/P EST MOD 30 MIN: CPT | Mod: 95 | Performed by: PHYSICIAN ASSISTANT

## 2023-11-30 RX ORDER — HYDROXYZINE HYDROCHLORIDE 25 MG/1
25-50 TABLET, FILM COATED ORAL 3 TIMES DAILY PRN
Qty: 60 TABLET | Refills: 1 | Status: SHIPPED | OUTPATIENT
Start: 2023-11-30

## 2023-11-30 ASSESSMENT — ASTHMA QUESTIONNAIRES: ACT_TOTALSCORE: 20

## 2023-11-30 ASSESSMENT — ANXIETY QUESTIONNAIRES
1. FEELING NERVOUS, ANXIOUS, OR ON EDGE: NEARLY EVERY DAY
GAD7 TOTAL SCORE: 13
GAD7 TOTAL SCORE: 13
7. FEELING AFRAID AS IF SOMETHING AWFUL MIGHT HAPPEN: SEVERAL DAYS
6. BECOMING EASILY ANNOYED OR IRRITABLE: SEVERAL DAYS
5. BEING SO RESTLESS THAT IT IS HARD TO SIT STILL: SEVERAL DAYS
3. WORRYING TOO MUCH ABOUT DIFFERENT THINGS: MORE THAN HALF THE DAYS
2. NOT BEING ABLE TO STOP OR CONTROL WORRYING: NEARLY EVERY DAY
IF YOU CHECKED OFF ANY PROBLEMS ON THIS QUESTIONNAIRE, HOW DIFFICULT HAVE THESE PROBLEMS MADE IT FOR YOU TO DO YOUR WORK, TAKE CARE OF THINGS AT HOME, OR GET ALONG WITH OTHER PEOPLE: EXTREMELY DIFFICULT

## 2023-11-30 ASSESSMENT — PATIENT HEALTH QUESTIONNAIRE - PHQ9
SUM OF ALL RESPONSES TO PHQ QUESTIONS 1-9: 17
5. POOR APPETITE OR OVEREATING: MORE THAN HALF THE DAYS

## 2023-12-22 ENCOUNTER — MYC REFILL (OUTPATIENT)
Dept: INTERNAL MEDICINE | Facility: CLINIC | Age: 23
End: 2023-12-22
Payer: COMMERCIAL

## 2023-12-22 ENCOUNTER — MYC REFILL (OUTPATIENT)
Dept: FAMILY MEDICINE | Facility: CLINIC | Age: 23
End: 2023-12-22
Payer: COMMERCIAL

## 2023-12-22 DIAGNOSIS — F41.0 PANIC ATTACK: ICD-10-CM

## 2023-12-22 DIAGNOSIS — J30.2 SEASONAL ALLERGIC RHINITIS, UNSPECIFIED TRIGGER: ICD-10-CM

## 2023-12-22 DIAGNOSIS — J45.20 MILD INTERMITTENT ASTHMA WITHOUT COMPLICATION: ICD-10-CM

## 2023-12-22 DIAGNOSIS — F41.1 GENERALIZED ANXIETY DISORDER: ICD-10-CM

## 2023-12-22 DIAGNOSIS — Z91.09 ENVIRONMENTAL ALLERGIES: ICD-10-CM

## 2023-12-26 RX ORDER — CETIRIZINE HYDROCHLORIDE 10 MG/1
10 TABLET ORAL DAILY
Qty: 30 TABLET | Refills: 11 | OUTPATIENT
Start: 2023-12-26

## 2023-12-26 RX ORDER — ALBUTEROL SULFATE 90 UG/1
2 AEROSOL, METERED RESPIRATORY (INHALATION) EVERY 6 HOURS
Qty: 8.5 G | Refills: 2 | Status: SHIPPED | OUTPATIENT
Start: 2023-12-26

## 2023-12-26 RX ORDER — LORAZEPAM 1 MG/1
0.5 TABLET ORAL EVERY 8 HOURS PRN
Qty: 10 TABLET | Refills: 0 | Status: CANCELLED | OUTPATIENT
Start: 2023-12-26

## 2023-12-26 NOTE — TELEPHONE ENCOUNTER
I do not see that the 9/11/23 prescription was ever filled at a pharmacy per the PDMP. Please have patient check with [facility].   Rosemary Austin PA-C

## 2023-12-28 RX ORDER — LORAZEPAM 1 MG/1
0.5 TABLET ORAL EVERY 8 HOURS PRN
Qty: 10 TABLET | Refills: 0 | Status: SHIPPED | OUTPATIENT
Start: 2023-12-28

## 2023-12-28 RX ORDER — CETIRIZINE HYDROCHLORIDE 10 MG/1
10 TABLET ORAL DAILY
Qty: 30 TABLET | Refills: 11 | Status: SHIPPED | OUTPATIENT
Start: 2023-12-28

## 2024-11-30 ENCOUNTER — HEALTH MAINTENANCE LETTER (OUTPATIENT)
Age: 24
End: 2024-11-30